# Patient Record
Sex: MALE | Race: WHITE | NOT HISPANIC OR LATINO | Employment: PART TIME | URBAN - METROPOLITAN AREA
[De-identification: names, ages, dates, MRNs, and addresses within clinical notes are randomized per-mention and may not be internally consistent; named-entity substitution may affect disease eponyms.]

---

## 2017-05-11 ENCOUNTER — HOSPITAL ENCOUNTER (EMERGENCY)
Facility: HOSPITAL | Age: 27
Discharge: HOME | End: 2017-05-11
Attending: EMERGENCY MEDICINE | Admitting: EMERGENCY MEDICINE
Payer: COMMERCIAL

## 2017-05-11 VITALS
HEIGHT: 74 IN | OXYGEN SATURATION: 100 % | WEIGHT: 200 LBS | BODY MASS INDEX: 25.67 KG/M2 | HEART RATE: 58 BPM | SYSTOLIC BLOOD PRESSURE: 123 MMHG | TEMPERATURE: 98.4 F | DIASTOLIC BLOOD PRESSURE: 65 MMHG

## 2017-05-11 DIAGNOSIS — B02.9 HERPES ZOSTER: Primary | ICD-10-CM

## 2017-05-11 DIAGNOSIS — H11.30 SUBCONJUNCTIVAL HEMORRHAGE: ICD-10-CM

## 2017-05-11 LAB
CLARITY, POC: CLEAR
COLOR, POC: YELLOW
EXT BILIRUBIN, UA: NORMAL
EXT BLOOD URINE: NORMAL
EXT GLUCOSE, UA: NORMAL
EXT KETONES: NORMAL
EXT NITRITE, UA: NORMAL
EXT PH, UA: 7
EXT PROTEIN, UA: NORMAL
EXT SPECIFIC GRAVITY, UA: 1
EXT UROBILINOGEN: NORMAL
WBC # BLD EST: NORMAL 10*3/UL

## 2017-05-11 PROCEDURE — 81002 URINALYSIS NONAUTO W/O SCOPE: CPT | Performed by: EMERGENCY MEDICINE

## 2017-05-11 PROCEDURE — 99283 EMERGENCY DEPT VISIT LOW MDM: CPT

## 2017-05-11 RX ORDER — OXYCODONE HYDROCHLORIDE AND ACETAMINOPHEN 5; 325 MG/1; MG/1
1 TABLET ORAL EVERY 6 HOURS PRN
Qty: 10 TABLET | Refills: 0 | Status: SHIPPED | OUTPATIENT
Start: 2017-05-11 | End: 2017-07-22 | Stop reason: ALTCHOICE

## 2017-05-11 RX ORDER — VALACYCLOVIR HYDROCHLORIDE 1 G/1
1000 TABLET, FILM COATED ORAL 3 TIMES DAILY
Qty: 21 TABLET | Refills: 0 | Status: SHIPPED | OUTPATIENT
Start: 2017-05-11 | End: 2017-07-22 | Stop reason: ALTCHOICE

## 2017-07-22 ENCOUNTER — APPOINTMENT (EMERGENCY)
Dept: RADIOLOGY | Facility: HOSPITAL | Age: 27
End: 2017-07-22
Payer: COMMERCIAL

## 2017-07-22 ENCOUNTER — HOSPITAL ENCOUNTER (EMERGENCY)
Facility: HOSPITAL | Age: 27
Discharge: HOME | End: 2017-07-22
Attending: EMERGENCY MEDICINE
Payer: COMMERCIAL

## 2017-07-22 VITALS
WEIGHT: 200 LBS | RESPIRATION RATE: 16 BRPM | SYSTOLIC BLOOD PRESSURE: 108 MMHG | TEMPERATURE: 98.3 F | OXYGEN SATURATION: 98 % | BODY MASS INDEX: 25.67 KG/M2 | HEART RATE: 80 BPM | DIASTOLIC BLOOD PRESSURE: 65 MMHG | HEIGHT: 74 IN

## 2017-07-22 DIAGNOSIS — S46.811A STRAIN OF RIGHT TRAPEZIUS MUSCLE: ICD-10-CM

## 2017-07-22 DIAGNOSIS — S93.401A SPRAIN OF UNSPECIFIED LIGAMENT OF RIGHT ANKLE, INITIAL ENCOUNTER: Primary | ICD-10-CM

## 2017-07-22 PROCEDURE — 99283 EMERGENCY DEPT VISIT LOW MDM: CPT

## 2017-07-22 PROCEDURE — 73610 X-RAY EXAM OF ANKLE: CPT | Performed by: EMERGENCY MEDICINE

## 2017-07-22 PROCEDURE — 73030 X-RAY EXAM OF SHOULDER: CPT | Performed by: EMERGENCY MEDICINE

## 2017-07-22 RX ORDER — IBUPROFEN 600 MG/1
600 TABLET ORAL ONCE
Status: COMPLETED | OUTPATIENT
Start: 2017-07-22 | End: 2017-07-22

## 2017-07-22 RX ADMIN — IBUPROFEN 600 MG: 600 TABLET ORAL at 19:36

## 2017-08-19 ENCOUNTER — ALLSCRIPTS OFFICE VISIT (OUTPATIENT)
Dept: OTHER | Facility: OTHER | Age: 27
End: 2017-08-19

## 2017-08-19 DIAGNOSIS — S46.911A STRAIN OF UNSPECIFIED MUSCLE, FASCIA AND TENDON AT SHOULDER AND UPPER ARM LEVEL, RIGHT ARM, INITIAL ENCOUNTER: ICD-10-CM

## 2017-08-19 DIAGNOSIS — M75.81 OTHER SHOULDER LESIONS, RIGHT SHOULDER: ICD-10-CM

## 2017-09-07 ENCOUNTER — TRANSCRIBE ORDERS (OUTPATIENT)
Dept: ADMINISTRATIVE | Facility: HOSPITAL | Age: 27
End: 2017-09-07

## 2017-09-07 ENCOUNTER — ALLSCRIPTS OFFICE VISIT (OUTPATIENT)
Dept: OTHER | Facility: OTHER | Age: 27
End: 2017-09-07

## 2017-09-07 DIAGNOSIS — M25.511 RIGHT SHOULDER PAIN, UNSPECIFIED CHRONICITY: Primary | ICD-10-CM

## 2017-09-13 ENCOUNTER — HOSPITAL ENCOUNTER (OUTPATIENT)
Dept: RADIOLOGY | Facility: HOSPITAL | Age: 27
Discharge: HOME | End: 2017-09-13
Attending: ORTHOPAEDIC SURGERY
Payer: COMMERCIAL

## 2017-09-13 ENCOUNTER — HOSPITAL ENCOUNTER (OUTPATIENT)
Dept: MRI IMAGING | Facility: HOSPITAL | Age: 27
Discharge: HOME | End: 2017-09-13
Attending: ORTHOPAEDIC SURGERY
Payer: COMMERCIAL

## 2017-09-13 DIAGNOSIS — M25.511 RIGHT SHOULDER PAIN, UNSPECIFIED CHRONICITY: ICD-10-CM

## 2017-09-13 PROCEDURE — A9585 GADOBUTROL INJECTION: HCPCS | Performed by: ORTHOPAEDIC SURGERY

## 2017-09-13 PROCEDURE — 77002 NEEDLE LOCALIZATION BY XRAY: CPT

## 2017-09-13 PROCEDURE — 73222 MRI JOINT UPR EXTREM W/DYE: CPT

## 2017-09-13 PROCEDURE — 23350 INJECTION FOR SHOULDER X-RAY: CPT

## 2017-09-13 RX ADMIN — IOHEXOL 50 ML: 300 INJECTION, SOLUTION INTRAVENOUS at 16:34

## 2017-09-13 RX ADMIN — GADOBUTROL 0.2 ML: 604.72 INJECTION INTRAVENOUS at 16:34

## 2017-09-18 ENCOUNTER — GENERIC CONVERSION - ENCOUNTER (OUTPATIENT)
Dept: OTHER | Facility: OTHER | Age: 27
End: 2017-09-18

## 2017-09-25 ENCOUNTER — GENERIC CONVERSION - ENCOUNTER (OUTPATIENT)
Dept: OTHER | Facility: OTHER | Age: 27
End: 2017-09-25

## 2017-09-27 ENCOUNTER — GENERIC CONVERSION - ENCOUNTER (OUTPATIENT)
Dept: OTHER | Facility: OTHER | Age: 27
End: 2017-09-27

## 2018-01-12 VITALS
HEIGHT: 75 IN | WEIGHT: 198 LBS | HEART RATE: 70 BPM | DIASTOLIC BLOOD PRESSURE: 80 MMHG | OXYGEN SATURATION: 98 % | SYSTOLIC BLOOD PRESSURE: 110 MMHG | BODY MASS INDEX: 24.62 KG/M2 | RESPIRATION RATE: 16 BRPM

## 2018-01-13 VITALS
HEIGHT: 75 IN | BODY MASS INDEX: 25.24 KG/M2 | SYSTOLIC BLOOD PRESSURE: 109 MMHG | DIASTOLIC BLOOD PRESSURE: 64 MMHG | HEART RATE: 66 BPM | WEIGHT: 203 LBS

## 2018-01-22 VITALS
WEIGHT: 200 LBS | BODY MASS INDEX: 24.87 KG/M2 | HEIGHT: 75 IN | HEART RATE: 68 BPM | SYSTOLIC BLOOD PRESSURE: 116 MMHG | DIASTOLIC BLOOD PRESSURE: 72 MMHG

## 2018-02-10 ENCOUNTER — HOSPITAL ENCOUNTER (EMERGENCY)
Facility: HOSPITAL | Age: 28
Discharge: HOME | End: 2018-02-11
Attending: EMERGENCY MEDICINE
Payer: COMMERCIAL

## 2018-02-10 VITALS
RESPIRATION RATE: 10 BRPM | TEMPERATURE: 96.5 F | BODY MASS INDEX: 25.67 KG/M2 | HEIGHT: 74 IN | OXYGEN SATURATION: 93 % | WEIGHT: 200 LBS | DIASTOLIC BLOOD PRESSURE: 56 MMHG | SYSTOLIC BLOOD PRESSURE: 117 MMHG | HEART RATE: 53 BPM

## 2018-02-10 DIAGNOSIS — T40.601A OPIATE OVERDOSE, ACCIDENTAL OR UNINTENTIONAL, INITIAL ENCOUNTER (HCC): Primary | ICD-10-CM

## 2018-02-10 LAB
AMPHETAMINES SERPL QL SCN: NEGATIVE
ANION GAP SERPL CALCULATED.3IONS-SCNC: 9 MMOL/L (ref 4–13)
BARBITURATES UR QL: NEGATIVE
BASOPHILS # BLD AUTO: 0.03 THOUSANDS/ΜL (ref 0–0.1)
BASOPHILS NFR BLD AUTO: 0 % (ref 0–1)
BENZODIAZ UR QL: NEGATIVE
BILIRUB UR QL STRIP: NEGATIVE
BUN SERPL-MCNC: 13 MG/DL (ref 5–25)
CALCIUM SERPL-MCNC: 8.9 MG/DL (ref 8.3–10.1)
CHLORIDE SERPL-SCNC: 99 MMOL/L (ref 100–108)
CLARITY UR: CLEAR
CO2 SERPL-SCNC: 32 MMOL/L (ref 21–32)
COCAINE UR QL: NEGATIVE
COLOR UR: YELLOW
CREAT SERPL-MCNC: 1.04 MG/DL (ref 0.6–1.3)
EOSINOPHIL # BLD AUTO: 0.05 THOUSAND/ΜL (ref 0–0.61)
EOSINOPHIL NFR BLD AUTO: 1 % (ref 0–6)
ERYTHROCYTE [DISTWIDTH] IN BLOOD BY AUTOMATED COUNT: 15.2 % (ref 11.6–15.1)
GFR SERPL CREATININE-BSD FRML MDRD: 97 ML/MIN/1.73SQ M
GLUCOSE SERPL-MCNC: 107 MG/DL (ref 65–140)
GLUCOSE UR STRIP-MCNC: NEGATIVE MG/DL
HCT VFR BLD AUTO: 48.4 % (ref 36.5–49.3)
HGB BLD-MCNC: 16.2 G/DL (ref 12–17)
HGB UR QL STRIP.AUTO: NEGATIVE
KETONES UR STRIP-MCNC: NEGATIVE MG/DL
LEUKOCYTE ESTERASE UR QL STRIP: NEGATIVE
LYMPHOCYTES # BLD AUTO: 1.24 THOUSANDS/ΜL (ref 0.6–4.47)
LYMPHOCYTES NFR BLD AUTO: 12 % (ref 14–44)
MCH RBC QN AUTO: 29.8 PG (ref 26.8–34.3)
MCHC RBC AUTO-ENTMCNC: 33.5 G/DL (ref 31.4–37.4)
MCV RBC AUTO: 89 FL (ref 82–98)
METHADONE UR QL: NEGATIVE
MONOCYTES # BLD AUTO: 0.83 THOUSAND/ΜL (ref 0.17–1.22)
MONOCYTES NFR BLD AUTO: 8 % (ref 4–12)
NEUTROPHILS # BLD AUTO: 8.57 THOUSANDS/ΜL (ref 1.85–7.62)
NEUTS SEG NFR BLD AUTO: 79 % (ref 43–75)
NITRITE UR QL STRIP: NEGATIVE
OPIATES UR QL SCN: POSITIVE
PCP UR QL: NEGATIVE
PH UR STRIP.AUTO: 6 [PH] (ref 4.5–8)
PLATELET # BLD AUTO: 332 THOUSANDS/UL (ref 149–390)
PMV BLD AUTO: 9.7 FL (ref 8.9–12.7)
POTASSIUM SERPL-SCNC: 3.4 MMOL/L (ref 3.5–5.3)
PROT UR STRIP-MCNC: NEGATIVE MG/DL
RBC # BLD AUTO: 5.44 MILLION/UL (ref 3.88–5.62)
SODIUM SERPL-SCNC: 140 MMOL/L (ref 136–145)
SP GR UR STRIP.AUTO: 1.02 (ref 1–1.03)
THC UR QL: NEGATIVE
UROBILINOGEN UR QL STRIP.AUTO: 0.2 E.U./DL
WBC # BLD AUTO: 10.72 THOUSAND/UL (ref 4.31–10.16)

## 2018-02-10 PROCEDURE — 80048 BASIC METABOLIC PNL TOTAL CA: CPT | Performed by: EMERGENCY MEDICINE

## 2018-02-10 PROCEDURE — 85025 COMPLETE CBC W/AUTO DIFF WBC: CPT | Performed by: EMERGENCY MEDICINE

## 2018-02-10 PROCEDURE — 93005 ELECTROCARDIOGRAM TRACING: CPT

## 2018-02-10 PROCEDURE — 80307 DRUG TEST PRSMV CHEM ANLYZR: CPT | Performed by: EMERGENCY MEDICINE

## 2018-02-10 PROCEDURE — 81003 URINALYSIS AUTO W/O SCOPE: CPT | Performed by: EMERGENCY MEDICINE

## 2018-02-10 PROCEDURE — 36415 COLL VENOUS BLD VENIPUNCTURE: CPT | Performed by: EMERGENCY MEDICINE

## 2018-02-10 RX ORDER — NALOXONE HYDROCHLORIDE 4 MG/.1ML
1 SPRAY NASAL AS NEEDED
Qty: 2 EACH | Refills: 2 | Status: SHIPPED | OUTPATIENT
Start: 2018-02-10 | End: 2019-01-16

## 2018-02-11 PROCEDURE — 99284 EMERGENCY DEPT VISIT MOD MDM: CPT

## 2018-02-11 NOTE — ED NOTES
Pt to ED after father found him on bathroom floor, pt reports he tried to inject 1 bag of heroin, but thinks it may have had fentanyl in it, dad administered Narcan that he had gotten from pharmacy  Pt woke up and dad drove him to ED       Billy Dale, RN  02/10/18 7164

## 2018-02-11 NOTE — DISCHARGE INSTRUCTIONS
Narcotic Abuse   WHAT YOU NEED TO KNOW:   Narcotics are medicines used to decrease or take away severe pain  Narcotics may also be called opioids  Some common names of narcotics ordered by a doctor are codeine and morphine  Heroin is an illegal street drug that is made from morphine  DISCHARGE INSTRUCTIONS:   Return to the emergency department if:   · You are very drowsy  · Your speech is slurred  · You have trouble thinking, remembering things, or focusing  Contact your healthcare provider if:   · You want help or information on how to stop using or abusing narcotics  Follow up with your healthcare provider as directed:  Write down your questions so you remember to ask them during your visits  Narcotic intoxication  usually lasts for several hours  You may have the following during or after you use narcotics:  · Behavior or mood changes, such as a great feeling followed by the feeling that you do not care about anyone or anything    · Trouble thinking, remembering things, or focusing    · Small pupils    · Feeling very drowsy    · Slurred speech  Narcotic withdrawal  occurs if you stop using narcotics after using them heavily over a period of time  Signs and symptoms may begin within minutes or days and continue for days or even months:  · Depression and anxiety    · Nausea or vomiting    · Muscle aches    · Watery eyes or runny nose    · Large pupils    · Sweating or goosebumps on your skin    · Diarrhea    · Fever    · Trouble sleeping  How narcotics can harm a pregnant woman and her baby:   · Tell your healthcare provider right away if you are trying to get pregnant or you are pregnant and you are using narcotics  Your doctor may suggest other medicines to control pain and prevent withdrawal  If you go through withdrawal while pregnant, you may miscarry your baby, or he may be stillborn  He may be very small and have other medical problems      · When your baby is born, he may show signs of withdrawal  This includes unexpected weight loss, poor feeding, and more crying than normal  Your baby may also have a fever, vomit, and have diarrhea  He may also have learning problems or other health issues when he gets older  If you have a baby and you are using narcotics, you may have trouble caring for your baby  Narcotics may be passed to your baby through breast milk  Talk to your healthcare provider before breastfeeding your baby if you are using narcotics  For support and more information:   · Substance Abuse and [address] , [address] [place]  Web Address: https://[employer]/  · [facility] on Drug Abuse  [address] [place] [address], [address] [place] [phone]  Phone: 9- 366 - 875-6265  Web Address: www davis nih gov  © 2017 [facility] [place] [address] is for End User's use only and may not be sold, redistributed or otherwise used for commercial purposes  All illustrations and images included in CareNotes® are the copyrighted property of [employer] A [employer] , [employer]  or Brayden Kay  The above information is an  only  It is not intended as medical advice for individual conditions or treatments  Talk to your doctor, nurse or pharmacist before following any medical regimen to see if it is safe and effective for you  Opioid Overdose   WHAT YOU NEED TO KNOW:   Opioids are prescription medicines used to treat pain  Some examples include morphine, codeine, methadone, oxycodone, and hydrocodone  An overdose can occur if you take more than the recommended amount  It can also occur if you take opioids with alcohol or certain medicines that can cause harm if taken together  An overdose can also occur if you take an opioid that was prescribed for someone else  Learn to take these medicines safely  An opioid overdose can be life-threatening     DISCHARGE INSTRUCTIONS:   Call 911 for any of the following:   · You have trouble staying awake and your breathing is slow or shallow  Return to the emergency department if:   · Your speech is slurred, or you are confused  · You are dizzy or stumble when you walk  · You are extremely drowsy, or you have trouble staying awake or speaking  · Your body is limp  · You have pale or clammy skin  · You have blue fingernails or lips  · Your heartbeat is slower than normal   Contact your healthcare provider if:   · You have questions or concerns about your condition or care  Follow up with your healthcare provider as directed:  Write down your questions so you remember to ask them during your visits  Prevent opioid overdose:   · Take your medicine exactly as directed  Do not take more of the recommended amount of opioids each time you take it  Do not take opioids more often than recommended  If you use a pain patch, be sure to remove the old patch before you place a new one  · Do not take opioids that belong to someone else  The amount of opioids that person takes may not be right for you  · Do not mix opioids with alcohol, sleeping pills, or street drugs  The combination of these substances can cause an overdose  · Learn about the signs of an overdose  so you know how to respond  Tell others about these symptoms so they will know what to do if needed  Talk to your healthcare provider about naloxone  In some states, you may be able to keep naloxone at home in case of an overdose  Your family and friends can also be trained on how to give it to you if needed  · Keep opioids out of the reach of children  Store opioids in a locked cabinet or in a location that children cannot get to  Ask your healthcare provider how to dispose of any unused opioid medicines  Counseling: Your healthcare provider may recommend that you see a counselor if you are abusing opioids     © 2017 [employer] Information is for End User's use only and may not be sold, redistributed or otherwise used for commercial purposes  All illustrations and images included in CareNotes® are the copyrighted property of A D A M , Inc  or Brayden Kay  The above information is an  only  It is not intended as medical advice for individual conditions or treatments  Talk to your doctor, nurse or pharmacist before following any medical regimen to see if it is safe and effective for you

## 2018-02-11 NOTE — ED PROVIDER NOTES
History  Chief Complaint   Patient presents with    Heroin Overdose - Accidental     Pt to ED, reports used 1 bag of heroin, then went out, dad found him on floor and administered narcan  History from patient and his father  This 33-year-old man admits to injecting 1 bag of heroin sometime around 6:00 p m  tonight  His father states that between 56 and 12 he found the patient unresponsive in the bathroom floor  He gave 1 dose of Narcan intranasally and aroused him  It took a few minutes for the patient to wake up  He was nauseous and complained of slight left chest tightness but now has no specific complaint  While the father and mother monitored him over the last few hours they noted he was bradycardic and want him checked out  Patient states otherwise he has been sober for several months  He has been in intensive outpatient therapy for drug use  He denies other drug and alcohol ingestion recently  He states that he found a bag of heroin in his old closed trauma today and decided to use it since he was not working  None       History reviewed  No pertinent past medical history  Past Surgical History:   Procedure Laterality Date    CYST REMOVAL      L chest        History reviewed  No pertinent family history  I have reviewed and agree with the history as documented  Social History   Substance Use Topics    Smoking status: Former Smoker     Types: Cigarettes    Smokeless tobacco: Never Used      Comment: quit 2 months ago     Alcohol use No        Review of Systems   Constitutional: Negative  HENT: Negative  Eyes: Negative  Respiratory: Negative  Cardiovascular: Negative  Gastrointestinal: Negative  Endocrine: Negative  Genitourinary: Negative  Musculoskeletal: Negative  Skin: Negative  Rash:   Rash around the right eye and left leg for the last week  Allergic/Immunologic: Negative  Neurological: Negative  Hematological: Negative  Psychiatric/Behavioral: Negative  All other systems reviewed and are negative  Physical Exam  ED Triage Vitals [02/10/18 2210]   Temperature Pulse Respirations Blood Pressure SpO2   (!) 96 5 °F (35 8 °C) (!) 53 14 130/71 96 %      Temp Source Heart Rate Source Patient Position - Orthostatic VS BP Location FiO2 (%)   Tympanic Monitor Lying Right arm --      Pain Score       --           Orthostatic Vital Signs  Vitals:    02/10/18 2210 02/10/18 2300 02/10/18 2315 02/10/18 2345   BP: 130/71 127/62 127/61 117/56   Pulse: (!) 53 62 74 (!) 53   Patient Position - Orthostatic VS: Lying Lying Lying Lying       Physical Exam   Constitutional: He is oriented to person, place, and time  He appears well-developed and well-nourished  No distress  HENT:   Head: Normocephalic and atraumatic  Right Ear: External ear normal    Left Ear: External ear normal    Mouth/Throat: Oropharynx is clear and moist    Eyes: Conjunctivae and EOM are normal  Pupils are equal, round, and reactive to light  Neck: Normal range of motion  Neck supple  No JVD present  Cardiovascular: Normal rate, regular rhythm, normal heart sounds and intact distal pulses  No murmur heard  Pulmonary/Chest: Effort normal and breath sounds normal    Abdominal: Soft  Bowel sounds are normal  He exhibits no distension and no mass  There is no tenderness  There is no rebound and no guarding  Musculoskeletal: Normal range of motion  He exhibits no edema or tenderness  Lymphadenopathy:     He has no cervical adenopathy  Neurological: He is alert and oriented to person, place, and time  He has normal reflexes  No cranial nerve deficit  Coordination normal    Skin: Skin is warm and dry  Rash ( scatteredPapillary rash of the right eyelid and right forehead  Several scattered small folliculitis type of lesions of the left leg and left shoulder no signs of cellulitis  No blistering ) noted  He is not diaphoretic     Psychiatric: He has a normal mood and affect  His behavior is normal    Nursing note and vitals reviewed  ED Medications  Medications - No data to display    Diagnostic Studies  Results Reviewed     Procedure Component Value Units Date/Time    Basic metabolic panel [[id number]]  (Abnormal) Collected:  02/10/18 2313    Lab Status:  Final result Specimen:  Blood from Arm, Right Updated:  02/10/18 2341     Sodium 140 mmol/L      Potassium 3 4 (L) mmol/L      Chloride 99 (L) mmol/L      CO2 32 mmol/L      Anion Gap 9 mmol/L      BUN 13 mg/dL      Creatinine 1 04 mg/dL      Glucose 107 mg/dL      Calcium 8 9 mg/dL      eGFR 97 ml/min/1 73sq m     Narrative:         National Kidney Disease Education Program recommendations are as follows:  GFR calculation is accurate only with a steady state creatinine  Chronic Kidney disease less than 60 ml/min/1 73 sq  meters  Kidney failure less than 15 ml/min/1 73 sq  meters  Rapid drug screen, urine [[id number]]  (Abnormal) Collected:  02/10/18 2313    Lab Status:  Final result Specimen:  Urine from Urine, Catheter Updated:  02/10/18 2337     Amph/Meth UR Negative     Barbiturate Ur Negative     Benzodiazepine Urine Negative     Cocaine Urine Negative     Methadone Urine Negative     Opiate Urine Positive (A)     PCP Ur Negative     THC Urine Negative    Narrative:         Presumptive report  If requested, specimen will be sent to reference lab for confirmation  FOR MEDICAL PURPOSES ONLY  IF CONFIRMATION NEEDED PLEASE CONTACT THE LAB WITHIN 5 DAYS      Drug Screen Cutoff Levels:  AMPHETAMINE/METHAMPHETAMINES  1000 ng/mL  BARBITURATES     200 ng/mL  BENZODIAZEPINES     200 ng/mL  COCAINE      300 ng/mL  METHADONE      300 ng/mL  OPIATES      300 ng/mL  PHENCYCLIDINE     25 ng/mL  THC       50 ng/mL    UA w Reflex to Microscopic [[id number]]  (Normal) Collected:  02/10/18 2313    Lab Status:  Final result Specimen:  Urine from Urine, Clean Catch Updated:  02/10/18 2330     Color, UA Yellow     Clarity, UA Clear Specific Gravity, UA 1 025     pH, UA 6 0     Leukocytes, UA Negative     Nitrite, UA Negative     Protein, UA Negative mg/dl      Glucose, UA Negative mg/dl      Ketones, UA Negative mg/dl      Urobilinogen, UA 0 2 E U /dl      Bilirubin, UA Negative     Blood, UA Negative    CBC and differential [[id number]]  (Abnormal) Collected:  02/10/18 2313    Lab Status:  Final result Specimen:  Blood from Arm, Right Updated:  02/10/18 2325     WBC 10 72 (H) Thousand/uL      RBC 5 44 Million/uL      Hemoglobin 16 2 g/dL      Hematocrit 48 4 %      MCV 89 fL      MCH 29 8 pg      MCHC 33 5 g/dL      RDW 15 2 (H) %      MPV 9 7 fL      Platelets 611 Thousands/uL      Neutrophils Relative 79 (H) %      Lymphocytes Relative 12 (L) %      Monocytes Relative 8 %      Eosinophils Relative 1 %      Basophils Relative 0 %      Neutrophils Absolute 8 57 (H) Thousands/µL      Lymphocytes Absolute 1 24 Thousands/µL      Monocytes Absolute 0 83 Thousand/µL      Eosinophils Absolute 0 05 Thousand/µL      Basophils Absolute 0 03 Thousands/µL                  No orders to display              Procedures  ECG 12 Lead Documentation  Date/Time: 2/10/2018 9:51 PM  Performed by: Dakotah Angel  Authorized by: Dakotah Angel     Previous ECG:     Previous ECG:  Compared to current    Comparison ECG info:   compared to November 19, 2015, heart rate has decreased by 63 beats per minute  T-wave inversion of inferior leads no longer present  Similarity:  Changes noted  Interpretation:     Interpretation comment:  Sinus bradycardia  Phone Contacts  ED Phone Contact    ED Course  ED Course as of Feb 11 0205   Sat Feb 10, 2018   2358  Awake and alert  Father at the bedside  Patient is cooperative and will be discharged                                  MDM  Number of Diagnoses or Management Options  Opiate overdose, accidental or unintentional, initial encounter: new and requires workup     Amount and/or Complexity of Data Reviewed  Clinical lab tests: ordered and reviewed  Obtain history from someone other than the patient: yes  Independent visualization of images, tracings, or specimens: yes      CritCare Time    Disposition  Final diagnoses:   Opiate overdose, accidental or unintentional, initial encounter     Time reflects when diagnosis was documented in both MDM as applicable and the Disposition within this note     Time User Action Codes Description Comment    2/10/2018 11:49 PM Jim Sánchez Add [T40 601A] Opiate overdose, accidental or unintentional, initial encounter       ED Disposition     ED Disposition Condition Comment    Discharge  [place]  discharge to home/self care  Condition at discharge: Stable        Follow-up Information     Follow up With Specialties Details Why Contact Info    Emre Wan MD Family Medicine Call As needed [id number] [facility] [address]      [place] Call in 1 day  [address] [facility] [address] [facility]  [phone]          Discharge Medication List as of 2/10/2018 11:52 PM      START taking these medications    Details   Naloxone HCl 4 MG/0 1ML LIQD 0 1 mL (4 mg total) into each nostril as needed (opiate overdose), Starting Sat 2/10/2018, Normal           No discharge procedures on file      ED Provider  Electronically Signed by           Micheline Corona DO  02/11/18 3831

## 2018-02-12 LAB
ATRIAL RATE: 53 BPM
P AXIS: 67 DEGREES
PR INTERVAL: 136 MS
QRS AXIS: 86 DEGREES
QRSD INTERVAL: 100 MS
QT INTERVAL: 408 MS
QTC INTERVAL: 382 MS
T WAVE AXIS: 55 DEGREES
VENTRICULAR RATE: 53 BPM

## 2018-03-25 ENCOUNTER — OFFICE VISIT (OUTPATIENT)
Dept: URGENT CARE | Facility: CLINIC | Age: 28
End: 2018-03-25
Payer: COMMERCIAL

## 2018-03-25 VITALS
TEMPERATURE: 98 F | WEIGHT: 204 LBS | BODY MASS INDEX: 25.36 KG/M2 | HEIGHT: 75 IN | HEART RATE: 60 BPM | DIASTOLIC BLOOD PRESSURE: 80 MMHG | RESPIRATION RATE: 16 BRPM | SYSTOLIC BLOOD PRESSURE: 124 MMHG | OXYGEN SATURATION: 97 %

## 2018-03-25 DIAGNOSIS — L20.89 OTHER ATOPIC DERMATITIS: Primary | ICD-10-CM

## 2018-03-25 PROCEDURE — G0382 LEV 3 HOSP TYPE B ED VISIT: HCPCS

## 2018-03-25 PROCEDURE — 99283 EMERGENCY DEPT VISIT LOW MDM: CPT

## 2018-03-25 RX ORDER — METHYLPREDNISOLONE 4 MG/1
TABLET ORAL
Qty: 1 EACH | Refills: 0 | Status: SHIPPED | OUTPATIENT
Start: 2018-03-25 | End: 2019-01-16

## 2018-03-26 NOTE — PROGRESS NOTES
[facility]        NAME: Wayne Coats is a 32 y o  male  : 1990    MRN: [id number]  DATE: 2018  TIME: 8:30 PM    Assessment and Plan   Other atopic dermatitis [L20 89]  1  Other atopic dermatitis  Methylprednisolone 4 MG TBPK         Patient Instructions       Follow up with PCP in 3-5 days  Proceed to  ER if symptoms worsen  Chief Complaint     Chief Complaint   Patient presents with    Rash     Rash under right eye for 1 month  Tried every OTC with no relief  States stings, burns, itchy, irritating  Right ear itchy and irritating, maybe like swimmers ear  History of Present Illness       64-year-old male presents complaining of rash under right eye for about a month as well as itchy ear canal on the right for several days  He reports he has tried witch Hazel, anti-itch cream, Lavender and other homeopathic remedies on the rash but did not improve  He reports it is not painful non itchy  It is not spreading  No injury to the area  Never any vesicles  He has not had anything like this before and denies a childhood history of eczema or asthma  Also complaining of itchy right ear canal   Denies pain, fever or chills  No change in hearing, no discharge  He has not tried anything for relief  No URI symptoms        Review of Systems   Review of Systems   Constitutional: Negative for chills and fever  HENT: Positive for ear pain  Negative for ear discharge, postnasal drip, rhinorrhea, sinus pressure and sore throat  Respiratory: Negative for cough and shortness of breath  Cardiovascular: Negative for chest pain  Skin: Positive for rash  Negative for wound           Current Medications       Current Outpatient Prescriptions:     Methylprednisolone 4 MG TBPK, Use as directed on package, Disp: 1 each, Rfl: 0    Naloxone HCl 4 MG/0 1ML LIQD, 0 1 mL (4 mg total) into each nostril as needed (opiate overdose), Disp: 2 each, Rfl: 2    Current Allergies     Allergies as of 03/25/2018    (No Known Allergies)            The following portions of the patient's history were reviewed and updated as appropriate: allergies, current medications, past family history, past medical history, past social history, past surgical history and problem list      No past medical history on file  Past Surgical History:   Procedure Laterality Date    CYST REMOVAL      L chest        No family history on file  Medications have been verified  Objective   /80   Pulse 60   Temp 98 °F (36 7 °C)   Resp 16   Ht 6' 3" (1 905 m)   Wt 92 5 kg (204 lb)   SpO2 97%   BMI 25 50 kg/m²        Physical Exam     Physical Exam   Constitutional: He is oriented to person, place, and time  He appears well-developed and well-nourished  No distress  HENT:   Head: Normocephalic and atraumatic  Right Ear: Hearing, tympanic membrane, external ear and ear canal normal    Left Ear: Hearing, tympanic membrane, external ear and ear canal normal    Mouth/Throat: Oropharynx is clear and moist    Eyes: Conjunctivae and EOM are normal  Pupils are equal, round, and reactive to light  Right eye exhibits no discharge  Left eye exhibits no discharge  No scleral icterus  Neck: Normal range of motion  Neck supple  No thyromegaly present  Cardiovascular: Normal rate, regular rhythm and normal heart sounds  Exam reveals no gallop and no friction rub  No murmur heard  Pulmonary/Chest: Effort normal and breath sounds normal  No respiratory distress  He has no wheezes  He has no rales  Musculoskeletal: Normal range of motion  He exhibits no edema  Lymphadenopathy:     He has no cervical adenopathy  Neurological: He is alert and oriented to person, place, and time  No cranial nerve deficit  Skin: Skin is warm and dry  Rash (Several erythematous papules about 2 inches below right eye  No scaling or pus) noted  He is not diaphoretic  No erythema  No pallor

## 2019-01-16 ENCOUNTER — OFFICE VISIT (OUTPATIENT)
Dept: FAMILY MEDICINE CLINIC | Facility: CLINIC | Age: 29
End: 2019-01-16
Payer: COMMERCIAL

## 2019-01-16 VITALS
OXYGEN SATURATION: 98 % | HEIGHT: 75 IN | HEART RATE: 77 BPM | BODY MASS INDEX: 26.49 KG/M2 | WEIGHT: 213 LBS | SYSTOLIC BLOOD PRESSURE: 128 MMHG | DIASTOLIC BLOOD PRESSURE: 86 MMHG

## 2019-01-16 DIAGNOSIS — Z77.21 HISTORY OF EXPOSURE TO HAZARDOUS BODILY FLUIDS: Primary | ICD-10-CM

## 2019-01-16 PROCEDURE — 3008F BODY MASS INDEX DOCD: CPT | Performed by: FAMILY MEDICINE

## 2019-01-16 PROCEDURE — 1036F TOBACCO NON-USER: CPT | Performed by: FAMILY MEDICINE

## 2019-01-16 PROCEDURE — 99213 OFFICE O/P EST LOW 20 MIN: CPT | Performed by: FAMILY MEDICINE

## 2019-01-16 NOTE — PROGRESS NOTES
[address]  [facility]        NAME: Marlyn Castro is a 25 y o  male  : 1990    MRN: [id number]  DATE: 2019  TIME: 6:00 PM    Assessment and Plan   History of exposure to hazardous bodily fluids [Z77 21]  1  History of exposure to hazardous bodily fluids  HIV-1 RNA, quantitative, PCR    Hepatitis C antibody    Chlamydia/GC amplified DNA by PCR    HIV-1 RNA, quantitative, PCR    Hepatitis C antibody    Chlamydia/GC amplified DNA by PCR         Patient Instructions     Patient Instructions   Ck labs for STD          Chief Complaint     Chief Complaint   Patient presents with    STD testing         History of Present Illness       C/o unprotected sex sev mos ago        Review of Systems   Review of Systems   Constitutional: Negative for fatigue, fever and unexpected weight change  HENT: Negative for congestion, sinus pressure and sore throat  Eyes: Negative for visual disturbance  Respiratory: Negative for shortness of breath and wheezing  Cardiovascular: Negative for chest pain and palpitations  Gastrointestinal: Negative for abdominal pain, diarrhea, nausea and vomiting  Genitourinary: Negative  Current Medications     No current outpatient prescriptions on file  Current Allergies     Allergies as of 2019    (No Known Allergies)            The following portions of the patient's history were reviewed and updated as appropriate: allergies, current medications, past family history, past medical history, past social history, past surgical history and problem list      History reviewed  No pertinent past medical history  Past Surgical History:   Procedure Laterality Date    CYST REMOVAL      L chest        Family History   Problem Relation Age of Onset    Hypertension Mother          Medications have been verified          Objective   /86   Pulse 77   Ht 6' 3" (1 905 m)   Wt 96 6 kg (213 lb)   SpO2 98%   BMI 26 62 kg/m²        Physical Exam     Physical Exam   Cardiovascular: Normal heart sounds      Pulmonary/Chest: Breath sounds normal    Genitourinary:   Genitourinary Comments: Genital exam NL

## 2019-01-29 LAB
C TRACH RRNA SPEC QL NAA+PROBE: NEGATIVE
HCV AB S/CO SERPL IA: >11 S/CO RATIO (ref 0–0.9)
HIV1 RNA # SERPL NAA+PROBE: <20 COPIES/ML
HIV1 RNA SERPL NAA+PROBE-LOG#: NORMAL LOG10COPY/ML
N GONORRHOEA RRNA SPEC QL NAA+PROBE: NEGATIVE

## 2019-01-30 ENCOUNTER — TELEPHONE (OUTPATIENT)
Dept: FAMILY MEDICINE CLINIC | Facility: CLINIC | Age: 29
End: 2019-01-30

## 2019-01-30 DIAGNOSIS — B19.20 HEPATITIS C VIRUS INFECTION WITHOUT HEPATIC COMA, UNSPECIFIED CHRONICITY: Primary | ICD-10-CM

## 2019-01-30 NOTE — TELEPHONE ENCOUNTER
I spoke to patient regarding his Lab Results, he WAS aware that he has Hep C and he has been treated  I told him I would check with Dr Genesis Ybarra whether he wants him to still see GI or if he will order additional testing as his Ab screen will always be positive  Reviewed with Dr Genesis Ybarra -- will order Hep C PCR to check if there is any viral load at this time  Called patient back and advised that PCR order placed, I told him  If he had told Dr Genesis Ybarra that he had Hep C and was treated already he would have ordered this test originally        ----- Message from Alana Montoya MD sent at 1/30/2019  5:51 AM EST -----  + hep C----refer GI

## 2019-02-18 ENCOUNTER — TELEPHONE (OUTPATIENT)
Dept: FAMILY MEDICINE CLINIC | Facility: CLINIC | Age: 29
End: 2019-02-18

## 2019-02-18 LAB
HCV RNA SERPL NAA+PROBE-ACNC: NORMAL IU/ML
TEST INFORMATION: NORMAL

## 2019-03-25 ENCOUNTER — OFFICE VISIT (OUTPATIENT)
Dept: FAMILY MEDICINE CLINIC | Facility: CLINIC | Age: 29
End: 2019-03-25
Payer: COMMERCIAL

## 2019-03-25 VITALS
HEART RATE: 88 BPM | SYSTOLIC BLOOD PRESSURE: 120 MMHG | RESPIRATION RATE: 16 BRPM | TEMPERATURE: 98.1 F | OXYGEN SATURATION: 98 % | HEIGHT: 75 IN | BODY MASS INDEX: 26.11 KG/M2 | DIASTOLIC BLOOD PRESSURE: 70 MMHG | WEIGHT: 210 LBS

## 2019-03-25 DIAGNOSIS — J01.00 ACUTE NON-RECURRENT MAXILLARY SINUSITIS: Primary | ICD-10-CM

## 2019-03-25 DIAGNOSIS — E66.3 OVERWEIGHT (BMI 25.0-29.9): ICD-10-CM

## 2019-03-25 PROCEDURE — 99213 OFFICE O/P EST LOW 20 MIN: CPT | Performed by: FAMILY MEDICINE

## 2019-03-25 RX ORDER — BENZONATATE 200 MG/1
200 CAPSULE ORAL 3 TIMES DAILY PRN
Qty: 20 CAPSULE | Refills: 0 | Status: SHIPPED | OUTPATIENT
Start: 2019-03-25 | End: 2020-03-17 | Stop reason: ALTCHOICE

## 2019-03-25 RX ORDER — AMOXICILLIN 500 MG/1
500 CAPSULE ORAL 3 TIMES DAILY
Qty: 30 CAPSULE | Refills: 0 | Status: SHIPPED | OUTPATIENT
Start: 2019-03-25 | End: 2019-04-04

## 2019-03-25 NOTE — PROGRESS NOTES
[address] [facility]        NAME: Yuly Saxena is a 25 y o  male  : 1990    MRN: [id number]  DATE: 2019  TIME: 8:38 AM    Assessment and Plan   Acute non-recurrent maxillary sinusitis [J01 00]  1  Acute non-recurrent maxillary sinusitis  benzonatate (TESSALON) 200 MG capsule    amoxicillin (AMOXIL) 500 mg capsule   2  Overweight (BMI 25 0-29  9)         No problem-specific Assessment & Plan notes found for this encounter  Patient Instructions     Patient Instructions   Take the antibiotic for 10 days  Over-the-counter medications and cough prescription as needed for symptoms  Work note provided  Recheck as needed  Chief Complaint     Chief Complaint   Patient presents with    Cold Like Symptoms     cold sxs         History of Present Illness       Ill X 5 days with sinus and cough symptoms  Sweats last night  Getting progressively worse  Cough is productive  Has chills but no documented fevers  History of viral pneumonias  Review of Systems   Review of Systems   Constitutional: Positive for chills, diaphoresis and fever  Negative for appetite change  HENT: Positive for sinus pressure and sinus pain  Negative for ear pain, rhinorrhea and sore throat  Eyes: Negative for discharge, redness and itching  Respiratory: Positive for cough  Negative for shortness of breath and wheezing  Cardiovascular: Negative for chest pain and palpitations  Rapid or slow heart rate   Gastrointestinal: Negative for abdominal pain, diarrhea, nausea and vomiting           Current Medications       Current Outpatient Medications:     amoxicillin (AMOXIL) 500 mg capsule, Take 1 capsule (500 mg total) by mouth 3 (three) times a day for 10 days please complete entire course of the antibiotic, Disp: 30 capsule, Rfl: 0    benzonatate (TESSALON) 200 MG capsule, Take 1 capsule (200 mg total) by mouth 3 (three) times a day as needed for cough, Disp: 20 capsule, Rfl: 0    Current Allergies     Allergies as of 03/25/2019    (No Known Allergies)            The following portions of the patient's history were reviewed and updated as appropriate: allergies, current medications, past family history, past medical history, past social history, past surgical history and problem list      History reviewed  No pertinent past medical history  Past Surgical History:   Procedure Laterality Date    CYST REMOVAL      L chest        Family History   Problem Relation Age of Onset    Hypertension Mother          Medications have been verified  Objective   /70   Pulse 88   Temp 98 1 °F (36 7 °C) (Oral)   Resp 16   Ht 6' 3" (1 905 m)   Wt 95 3 kg (210 lb)   SpO2 98%   BMI 26 25 kg/m²        Physical Exam     Physical Exam   Constitutional: He appears well-developed and well-nourished  No distress  HENT:   Head: Normocephalic and atraumatic  Right Ear: Tympanic membrane and external ear normal  No drainage  Left Ear: Tympanic membrane normal  No drainage  Nose: Mucosal edema, rhinorrhea and sinus tenderness present  Mouth/Throat: Posterior oropharyngeal edema present  No oropharyngeal exudate  Eyes: Conjunctivae and EOM are normal  Right eye exhibits no discharge  Left eye exhibits no discharge  Neck: Normal range of motion  Neck supple  No thyromegaly present  Cardiovascular: Normal rate, regular rhythm and normal heart sounds  Pulmonary/Chest: Effort normal  No respiratory distress  He has no wheezes  He has no rales  Abdominal: Soft  Lymphadenopathy:     He has no cervical adenopathy  Skin: Skin is warm and dry  BMI Counseling: Body mass index is 26 25 kg/m²  Discussed the patient's BMI with him  The BMI is above average  BMI counseling and education was provided to the patient  Nutrition recommendations include 3-5 servings of fruits/vegetables daily

## 2019-03-25 NOTE — LETTER
March 25, 2019     Patient: Dilip Julien   YOB: 1990   Date of Visit: 3/25/2019       To Whom it May Concern:    William Sanchez is under my professional care  He was seen in my office on 3/25/2019  He may return to work on 3/27/2019  Excuse work absence due to illness  May return 03/26/2019 if feeling improved       If you have any questions or concerns, please don't hesitate to call           Sincerely,          Mona Arevalo MD        CC: No Recipients

## 2019-03-25 NOTE — PATIENT INSTRUCTIONS
Take the antibiotic for 10 days  Over-the-counter medications and cough prescription as needed for symptoms  Work note provided  Recheck as needed

## 2019-04-04 ENCOUNTER — OFFICE VISIT (OUTPATIENT)
Dept: FAMILY MEDICINE CLINIC | Facility: CLINIC | Age: 29
End: 2019-04-04
Payer: COMMERCIAL

## 2019-04-04 VITALS
BODY MASS INDEX: 26.11 KG/M2 | SYSTOLIC BLOOD PRESSURE: 128 MMHG | WEIGHT: 210 LBS | RESPIRATION RATE: 16 BRPM | DIASTOLIC BLOOD PRESSURE: 76 MMHG | HEART RATE: 76 BPM | HEIGHT: 75 IN

## 2019-04-04 DIAGNOSIS — B36.0 TINEA VERSICOLOR: Primary | ICD-10-CM

## 2019-04-04 PROCEDURE — 1036F TOBACCO NON-USER: CPT | Performed by: FAMILY MEDICINE

## 2019-04-04 PROCEDURE — 99213 OFFICE O/P EST LOW 20 MIN: CPT | Performed by: FAMILY MEDICINE

## 2019-04-04 PROCEDURE — 3008F BODY MASS INDEX DOCD: CPT | Performed by: FAMILY MEDICINE

## 2019-04-04 RX ORDER — TERBINAFINE HYDROCHLORIDE 250 MG/1
250 TABLET ORAL DAILY
Qty: 10 TABLET | Refills: 0 | Status: SHIPPED | OUTPATIENT
Start: 2019-04-04 | End: 2019-04-14

## 2019-04-04 RX ORDER — PRENATAL VIT 91/IRON/FOLIC/DHA 28-975-200
COMBINATION PACKAGE (EA) ORAL 2 TIMES DAILY
Qty: 42 G | Refills: 1 | Status: SHIPPED | OUTPATIENT
Start: 2019-04-04 | End: 2019-07-23 | Stop reason: SDUPTHER

## 2019-07-10 ENCOUNTER — OFFICE VISIT (OUTPATIENT)
Dept: FAMILY MEDICINE CLINIC | Facility: CLINIC | Age: 29
End: 2019-07-10
Payer: COMMERCIAL

## 2019-07-10 VITALS
HEART RATE: 88 BPM | HEIGHT: 75 IN | BODY MASS INDEX: 26.11 KG/M2 | WEIGHT: 210 LBS | SYSTOLIC BLOOD PRESSURE: 126 MMHG | DIASTOLIC BLOOD PRESSURE: 88 MMHG | OXYGEN SATURATION: 98 %

## 2019-07-10 DIAGNOSIS — Z77.21 EXPOSURE TO BLOOD OR BODY FLUID: Primary | ICD-10-CM

## 2019-07-10 PROCEDURE — 99214 OFFICE O/P EST MOD 30 MIN: CPT | Performed by: FAMILY MEDICINE

## 2019-07-10 NOTE — PROGRESS NOTES
[address]  [facility]        NAME: Teddy Delgadillo is a 30 y o  male  : 1990    MRN: [id number]  DATE: July 10, 2019  TIME: 5:59 PM    Assessment and Plan   No primary diagnosis found  No diagnosis found  Patient Instructions     There are no Patient Instructions on file for this visit  Chief Complaint     Chief Complaint   Patient presents with    Labs Only     would like STD testing          History of Present Illness       Pt would like STD testing--asympt      Review of Systems   Review of Systems   Constitutional: Negative for fatigue, fever and unexpected weight change  HENT: Negative for congestion, sinus pain and sore throat  Eyes: Negative for visual disturbance  Respiratory: Negative for shortness of breath and wheezing  Cardiovascular: Negative for chest pain and palpitations  Gastrointestinal: Negative for abdominal pain, nausea and vomiting  Musculoskeletal: Negative  Negative for arthralgias and myalgias  Neurological: Negative for syncope, weakness and numbness  Psychiatric/Behavioral: Negative  Negative for confusion, dysphoric mood and suicidal ideas  Current Medications       Current Outpatient Medications:     benzonatate (TESSALON) 200 MG capsule, Take 1 capsule (200 mg total) by mouth 3 (three) times a day as needed for cough (Patient not taking: Reported on 2019), Disp: 20 capsule, Rfl: 0    terbinafine (LamISIL) 1 % cream, Apply topically 2 (two) times a day (Patient not taking: Reported on 7/10/2019), Disp: 42 g, Rfl: 1    Current Allergies     Allergies as of 07/10/2019    (No Known Allergies)            The following portions of the patient's history were reviewed and updated as appropriate: allergies, current medications, past family history, past medical history, past social history, past surgical history and problem list      No past medical history on file      Past Surgical History:   Procedure Laterality Date    CYST REMOVAL      L chest        Family History   Problem Relation Age of Onset    Hypertension Mother          Medications have been verified  Objective   /88   Pulse 88   Ht 6' 3" (1 905 m)   Wt 95 3 kg (210 lb)   SpO2 98%   BMI 26 25 kg/m²        Physical Exam     Physical Exam   Constitutional: He is oriented to person, place, and time  Vital signs are normal  He appears well-developed and well-nourished  HENT:   Right Ear: Ear canal normal  Tympanic membrane is not injected  Left Ear: Ear canal normal  Tympanic membrane is not injected  Nose: Nose normal    Mouth/Throat: Oropharynx is clear and moist    Eyes: Pupils are equal, round, and reactive to light  Conjunctivae and EOM are normal  Right eye exhibits no discharge  Left eye exhibits no discharge  Neck: Normal range of motion  Neck supple  No thyromegaly present  Cardiovascular: Normal rate, regular rhythm and normal heart sounds  No murmur heard  Pulmonary/Chest: Effort normal and breath sounds normal  No respiratory distress  He has no wheezes  Abdominal: Soft  Bowel sounds are normal  He exhibits no distension  There is no tenderness  Musculoskeletal: Normal range of motion  Lymphadenopathy:     He has no cervical adenopathy  Neurological: He is alert and oriented to person, place, and time  He has normal strength and normal reflexes  He is not disoriented  No sensory deficit  Gait normal    Skin: Skin is warm and dry  Psychiatric: He has a normal mood and affect   His speech is normal and behavior is normal  Judgment and thought content normal  Cognition and memory are normal

## 2019-07-23 ENCOUNTER — TELEPHONE (OUTPATIENT)
Dept: FAMILY MEDICINE CLINIC | Facility: CLINIC | Age: 29
End: 2019-07-23

## 2019-07-23 DIAGNOSIS — B36.0 TINEA VERSICOLOR: ICD-10-CM

## 2019-07-23 DIAGNOSIS — L30.9 ECZEMA, UNSPECIFIED TYPE: Primary | ICD-10-CM

## 2019-07-23 RX ORDER — TRIAMCINOLONE ACETONIDE 5 MG/G
CREAM TOPICAL 3 TIMES DAILY
Qty: 30 G | Refills: 3 | Status: SHIPPED | OUTPATIENT
Start: 2019-07-23 | End: 2020-03-17 | Stop reason: ALTCHOICE

## 2019-07-23 RX ORDER — PRENATAL VIT 91/IRON/FOLIC/DHA 28-975-200
COMBINATION PACKAGE (EA) ORAL 2 TIMES DAILY
Qty: 42 G | Refills: 1 | Status: SHIPPED | OUTPATIENT
Start: 2019-07-23 | End: 2020-03-17 | Stop reason: ALTCHOICE

## 2019-07-23 NOTE — TELEPHONE ENCOUNTER
Pt states he was suppose to get a steroid cream was seen 2 weeks ago     Refill lamisil as well     [facility] [place]

## 2019-07-31 ENCOUNTER — OFFICE VISIT (OUTPATIENT)
Dept: FAMILY MEDICINE CLINIC | Facility: CLINIC | Age: 29
End: 2019-07-31
Payer: COMMERCIAL

## 2019-07-31 VITALS
HEART RATE: 77 BPM | WEIGHT: 207 LBS | SYSTOLIC BLOOD PRESSURE: 124 MMHG | TEMPERATURE: 97.4 F | HEIGHT: 75 IN | BODY MASS INDEX: 25.74 KG/M2 | DIASTOLIC BLOOD PRESSURE: 88 MMHG | OXYGEN SATURATION: 98 %

## 2019-07-31 DIAGNOSIS — J02.8 ACUTE PHARYNGITIS DUE TO OTHER SPECIFIED ORGANISMS: Primary | ICD-10-CM

## 2019-07-31 LAB — S PYO AG THROAT QL: NEGATIVE

## 2019-07-31 PROCEDURE — 3008F BODY MASS INDEX DOCD: CPT | Performed by: FAMILY MEDICINE

## 2019-07-31 PROCEDURE — 87880 STREP A ASSAY W/OPTIC: CPT | Performed by: FAMILY MEDICINE

## 2019-07-31 PROCEDURE — 99213 OFFICE O/P EST LOW 20 MIN: CPT | Performed by: FAMILY MEDICINE

## 2019-07-31 NOTE — LETTER
July 31, 2019     Patient: Shonda Cao   YOB: 1990   Date of Visit: 7/31/2019       To Whom it May Concern:    Merrill Downs is under my professional care  He was seen in my office on 7/31/2019  He may return to school on 8/2/2019   Excuse work absence due to illness       If you have any questions or concerns, please don't hesitate to call           Sincerely,          Ashley Castaneda MD        CC: No Recipients

## 2019-07-31 NOTE — PROGRESS NOTES
[address] [facility]         NAME: Stoney Santos is a 30 y o  male  : 1990    MRN: [id number]  DATE: 2019  TIME: 1:28 PM    Assessment and Plan   Acute pharyngitis due to other specified organisms [J02 8]  1  Acute pharyngitis due to other specified organisms  POCT rapid strepA       No problem-specific Assessment & Plan notes found for this encounter  Patient Instructions     Patient Instructions   The rapid strep test is is negative  This which is just a viral etiology for your sore throat  My advice would be to continue take Tylenol or ibuprofen as needed  Maintain your hydration well  This could be a recurrence of your mono although I do not think that is the case  I would see with this does over the next 2-3 days  If things persist we could do a CBC, Lyme titer, and EBV virus titers to further investigate  If you start seeing more enlarged mm lymph nodes, tonsils or white spots on the tonsils are becoming more prevalent call for possible antibiotic  Chief Complaint     Chief Complaint   Patient presents with    Sore Throat     for 2 days     Muscle Pain    Earache    Fatigue    Fever         History of Present Illness       Patient has been ill for the last 2-3 days  Muscle aches, headaches, severe sore throat, and chills  Some low back pain  There are some swollen glands in the neck  No sick exposures recognized  No rashes or bites reported  Review of Systems   Review of Systems   Constitutional: Positive for chills and fever  Negative for appetite change and diaphoresis  HENT: Positive for sore throat and trouble swallowing  Negative for ear pain, rhinorrhea and sinus pressure  Eyes: Negative for discharge, redness and itching  Respiratory: Negative for cough, shortness of breath and wheezing  Cardiovascular: Negative for chest pain and palpitations          Rapid or slow heart rate   Gastrointestinal: Negative for abdominal pain, diarrhea, nausea and vomiting  Neurological: Positive for headaches  Current Medications       Current Outpatient Medications:     benzonatate (TESSALON) 200 MG capsule, Take 1 capsule (200 mg total) by mouth 3 (three) times a day as needed for cough (Patient not taking: Reported on 4/4/2019), Disp: 20 capsule, Rfl: 0    terbinafine (LamISIL) 1 % cream, Apply topically 2 (two) times a day, Disp: 42 g, Rfl: 1    triamcinolone (KENALOG) 0 5 % cream, Apply topically 3 (three) times a day, Disp: 30 g, Rfl: 3    Current Allergies     Allergies as of 07/31/2019    (No Known Allergies)            The following portions of the patient's history were reviewed and updated as appropriate: allergies, current medications, past family history, past medical history, past social history, past surgical history and problem list      History reviewed  No pertinent past medical history  Past Surgical History:   Procedure Laterality Date    CYST REMOVAL      L chest        Family History   Problem Relation Age of Onset    Hypertension Mother          Medications have been verified  Objective   /88   Pulse 77   Temp (!) 97 4 °F (36 3 °C)   Ht 6' 3" (1 905 m)   Wt 93 9 kg (207 lb)   SpO2 98%   BMI 25 87 kg/m²        Physical Exam     Physical Exam   Constitutional: He appears well-developed and well-nourished  No distress  HENT:   Head: Normocephalic and atraumatic  Right Ear: Tympanic membrane and external ear normal  No drainage  Left Ear: Tympanic membrane normal  No drainage  Nose: Right sinus exhibits no maxillary sinus tenderness  Left sinus exhibits no maxillary sinus tenderness  Mouth/Throat: Oropharyngeal exudate and posterior oropharyngeal erythema present  Tonsils are 2+ on the right  Tonsils are 2+ on the left  Eyes: Conjunctivae and EOM are normal  Right eye exhibits no discharge  Left eye exhibits no discharge  Neck: Normal range of motion  Neck supple   No thyromegaly present  Cardiovascular: Normal rate, regular rhythm and normal heart sounds  Pulmonary/Chest: Effort normal  No respiratory distress  He has no wheezes  He has no rales  Abdominal: Soft  Lymphadenopathy:     He has cervical adenopathy  Skin: Skin is warm and dry

## 2019-07-31 NOTE — PATIENT INSTRUCTIONS
The rapid strep test is is negative  This which is just a viral etiology for your sore throat  My advice would be to continue take Tylenol or ibuprofen as needed  Maintain your hydration well  This could be a recurrence of your mono although I do not think that is the case  I would see with this does over the next 2-3 days  If things persist we could do a CBC, Lyme titer, and EBV virus titers to further investigate  If you start seeing more enlarged mm lymph nodes, tonsils or white spots on the tonsils are becoming more prevalent call for possible antibiotic

## 2019-08-01 ENCOUNTER — HOSPITAL ENCOUNTER (EMERGENCY)
Facility: HOSPITAL | Age: 29
Discharge: HOME | End: 2019-08-01
Attending: EMERGENCY MEDICINE | Admitting: EMERGENCY MEDICINE
Payer: COMMERCIAL

## 2019-08-01 ENCOUNTER — APPOINTMENT (EMERGENCY)
Dept: RADIOLOGY | Facility: HOSPITAL | Age: 29
End: 2019-08-01
Attending: EMERGENCY MEDICINE
Payer: COMMERCIAL

## 2019-08-01 VITALS
DIASTOLIC BLOOD PRESSURE: 51 MMHG | HEIGHT: 75 IN | HEART RATE: 79 BPM | OXYGEN SATURATION: 95 % | SYSTOLIC BLOOD PRESSURE: 104 MMHG | TEMPERATURE: 102.5 F | WEIGHT: 210 LBS | BODY MASS INDEX: 26.11 KG/M2 | RESPIRATION RATE: 17 BRPM

## 2019-08-01 DIAGNOSIS — J02.9 PHARYNGITIS: Primary | ICD-10-CM

## 2019-08-01 DIAGNOSIS — M79.10 MYALGIA: ICD-10-CM

## 2019-08-01 DIAGNOSIS — R50.9 FEBRILE ILLNESS: ICD-10-CM

## 2019-08-01 LAB
ALBUMIN SERPL BCP-MCNC: 3.9 G/DL (ref 3.5–5)
ALP SERPL-CCNC: 54 U/L (ref 46–116)
ALT SERPL W P-5'-P-CCNC: 56 U/L (ref 12–78)
ANION GAP SERPL CALCULATED.3IONS-SCNC: 12 MMOL/L (ref 4–13)
AST SERPL W P-5'-P-CCNC: 37 U/L (ref 5–45)
BACTERIA UR QL AUTO: ABNORMAL /HPF
BASOPHILS # BLD AUTO: 0.03 THOUSANDS/ΜL (ref 0–0.1)
BASOPHILS NFR BLD AUTO: 0 % (ref 0–1)
BILIRUB SERPL-MCNC: 0.5 MG/DL (ref 0.2–1)
BILIRUB UR QL STRIP: NEGATIVE
BUN SERPL-MCNC: 12 MG/DL (ref 5–25)
CALCIUM SERPL-MCNC: 9.3 MG/DL (ref 8.3–10.1)
CHLORIDE SERPL-SCNC: 100 MMOL/L (ref 100–108)
CLARITY UR: CLEAR
CO2 SERPL-SCNC: 26 MMOL/L (ref 21–32)
COLOR UR: YELLOW
CREAT SERPL-MCNC: 1.23 MG/DL (ref 0.6–1.3)
EOSINOPHIL # BLD AUTO: 0.02 THOUSAND/ΜL (ref 0–0.61)
EOSINOPHIL NFR BLD AUTO: 0 % (ref 0–6)
ERYTHROCYTE [DISTWIDTH] IN BLOOD BY AUTOMATED COUNT: 14.1 % (ref 11.6–15.1)
GFR SERPL CREATININE-BSD FRML MDRD: 78 ML/MIN/1.73SQ M
GLUCOSE SERPL-MCNC: 120 MG/DL (ref 65–140)
GLUCOSE UR STRIP-MCNC: NEGATIVE MG/DL
HCT VFR BLD AUTO: 49 % (ref 36.5–49.3)
HGB BLD-MCNC: 16.5 G/DL (ref 12–17)
HGB UR QL STRIP.AUTO: ABNORMAL
IMM GRANULOCYTES # BLD AUTO: 0.04 THOUSAND/UL (ref 0–0.2)
IMM GRANULOCYTES NFR BLD AUTO: 0 % (ref 0–2)
KETONES UR STRIP-MCNC: NEGATIVE MG/DL
LEUKOCYTE ESTERASE UR QL STRIP: NEGATIVE
LIPASE SERPL-CCNC: 112 U/L (ref 73–393)
LYMPHOCYTES # BLD AUTO: 0.59 THOUSANDS/ΜL (ref 0.6–4.47)
LYMPHOCYTES NFR BLD AUTO: 5 % (ref 14–44)
MCH RBC QN AUTO: 29.9 PG (ref 26.8–34.3)
MCHC RBC AUTO-ENTMCNC: 33.7 G/DL (ref 31.4–37.4)
MCV RBC AUTO: 89 FL (ref 82–98)
MONOCYTES # BLD AUTO: 0.93 THOUSAND/ΜL (ref 0.17–1.22)
MONOCYTES NFR BLD AUTO: 8 % (ref 4–12)
NEUTROPHILS # BLD AUTO: 10.06 THOUSANDS/ΜL (ref 1.85–7.62)
NEUTS SEG NFR BLD AUTO: 87 % (ref 43–75)
NITRITE UR QL STRIP: NEGATIVE
NON-SQ EPI CELLS URNS QL MICRO: ABNORMAL /HPF
NRBC BLD AUTO-RTO: 0 /100 WBCS
PH UR STRIP.AUTO: 8 [PH]
PLATELET # BLD AUTO: 269 THOUSANDS/UL (ref 149–390)
PMV BLD AUTO: 9.9 FL (ref 8.9–12.7)
POTASSIUM SERPL-SCNC: 4.1 MMOL/L (ref 3.5–5.3)
PROT SERPL-MCNC: 8.4 G/DL (ref 6.4–8.2)
PROT UR STRIP-MCNC: ABNORMAL MG/DL
RBC # BLD AUTO: 5.52 MILLION/UL (ref 3.88–5.62)
RBC #/AREA URNS AUTO: ABNORMAL /HPF
SODIUM SERPL-SCNC: 138 MMOL/L (ref 136–145)
SP GR UR STRIP.AUTO: 1.01 (ref 1–1.03)
UROBILINOGEN UR QL STRIP.AUTO: 0.2 E.U./DL
WBC # BLD AUTO: 11.67 THOUSAND/UL (ref 4.31–10.16)
WBC #/AREA URNS AUTO: ABNORMAL /HPF

## 2019-08-01 PROCEDURE — 71046 X-RAY EXAM CHEST 2 VIEWS: CPT

## 2019-08-01 PROCEDURE — 36415 COLL VENOUS BLD VENIPUNCTURE: CPT | Performed by: EMERGENCY MEDICINE

## 2019-08-01 PROCEDURE — 96361 HYDRATE IV INFUSION ADD-ON: CPT

## 2019-08-01 PROCEDURE — 81001 URINALYSIS AUTO W/SCOPE: CPT | Performed by: EMERGENCY MEDICINE

## 2019-08-01 PROCEDURE — 86618 LYME DISEASE ANTIBODY: CPT | Performed by: EMERGENCY MEDICINE

## 2019-08-01 PROCEDURE — 83690 ASSAY OF LIPASE: CPT | Performed by: EMERGENCY MEDICINE

## 2019-08-01 PROCEDURE — 99283 EMERGENCY DEPT VISIT LOW MDM: CPT

## 2019-08-01 PROCEDURE — 96374 THER/PROPH/DIAG INJ IV PUSH: CPT

## 2019-08-01 PROCEDURE — 96375 TX/PRO/DX INJ NEW DRUG ADDON: CPT

## 2019-08-01 PROCEDURE — 99284 EMERGENCY DEPT VISIT MOD MDM: CPT | Performed by: EMERGENCY MEDICINE

## 2019-08-01 PROCEDURE — 80053 COMPREHEN METABOLIC PANEL: CPT | Performed by: EMERGENCY MEDICINE

## 2019-08-01 PROCEDURE — 85025 COMPLETE CBC W/AUTO DIFF WBC: CPT | Performed by: EMERGENCY MEDICINE

## 2019-08-01 RX ORDER — KETOROLAC TROMETHAMINE 30 MG/ML
15 INJECTION, SOLUTION INTRAMUSCULAR; INTRAVENOUS ONCE
Status: COMPLETED | OUTPATIENT
Start: 2019-08-01 | End: 2019-08-01

## 2019-08-01 RX ORDER — ONDANSETRON 2 MG/ML
4 INJECTION INTRAMUSCULAR; INTRAVENOUS ONCE
Status: COMPLETED | OUTPATIENT
Start: 2019-08-01 | End: 2019-08-01

## 2019-08-01 RX ORDER — ACETAMINOPHEN 325 MG/1
975 TABLET ORAL ONCE
Status: COMPLETED | OUTPATIENT
Start: 2019-08-01 | End: 2019-08-01

## 2019-08-01 RX ORDER — NAPROXEN 500 MG/1
500 TABLET ORAL 2 TIMES DAILY WITH MEALS
Qty: 10 TABLET | Refills: 0 | Status: SHIPPED | OUTPATIENT
Start: 2019-08-01 | End: 2020-03-17 | Stop reason: ALTCHOICE

## 2019-08-01 RX ADMIN — ONDANSETRON 4 MG: 2 INJECTION INTRAMUSCULAR; INTRAVENOUS at 08:09

## 2019-08-01 RX ADMIN — SODIUM CHLORIDE 2000 ML: 0.9 INJECTION, SOLUTION INTRAVENOUS at 08:06

## 2019-08-01 RX ADMIN — KETOROLAC TROMETHAMINE 15 MG: 30 INJECTION, SOLUTION INTRAMUSCULAR; INTRAVENOUS at 08:09

## 2019-08-01 RX ADMIN — Medication 10 MG: at 09:10

## 2019-08-01 RX ADMIN — ACETAMINOPHEN 975 MG: 325 TABLET, FILM COATED ORAL at 08:08

## 2019-08-01 NOTE — ED PROVIDER NOTES
History  Chief Complaint   Patient presents with    Flu Symptoms     pt states since monday he has had sore throat, fever, chills, headache, body aches, fatigue since monday  states he saw family doctor and was only tested for strep  has been taking OTC medicine but hasnt been feeling any better      HPI     49-year-old male presenting with sore throat myalgias dull achy headache feeling fatigued this all began Monday  Patient saw his PCP and had a negative strep test   Patient has been taking Tylenol Motrin over-the-counter not help the symptoms  Patient is here because of the progressing symptoms  Subjective fevers at home  Worse sore throat swelling  No voice change drooling  Patient also admits to intermittent nonproductive cough  No smoking history  Patient Headache is coming going dull currently a 5/10  No phonophobia photophobia  Began slowly no thunderclap  Patient denies neck pain neck stiffness  No sick contacts at home  Patient former IV drug user  Has not used drugs in a couple of years  Patient does admit to a rash to his abdomen  Patient states the rash is cleared over last week  Patient works in construction  Patient denies chills rigors neck pain neck stiffness chest pain palpitations shortness of breath cough pleurisy abdominal pain nausea vomiting diarrhea constipation  Patient states he has been peeing more frequently  Patient denies other urinary symptoms  No flank pain  Prior to Admission Medications   Prescriptions Last Dose Informant Patient Reported?  Taking?   benzonatate (TESSALON) 200 MG capsule   No No   Sig: Take 1 capsule (200 mg total) by mouth 3 (three) times a day as needed for cough   Patient not taking: Reported on 4/4/2019   terbinafine (LamISIL) 1 % cream   No No   Sig: Apply topically 2 (two) times a day   triamcinolone (KENALOG) 0 5 % cream   No No   Sig: Apply topically 3 (three) times a day      Facility-Administered Medications: None       Past Medical History:   Diagnosis Date    Hepatitis C virus infection cured after antiviral drug therapy        Past Surgical History:   Procedure Laterality Date    CYST REMOVAL      L chest        Family History   Problem Relation Age of Onset    Hypertension Mother      I have reviewed and agree with the history as documented  Social History     Tobacco Use    Smoking status: Former Smoker     Types: Cigarettes    Smokeless tobacco: Never Used    Tobacco comment: quit 2015   Substance Use Topics    Alcohol use: No    Drug use: No     Comment: hx opiate abuse        Review of Systems   Constitutional: Positive for fatigue and fever  Negative for activity change, appetite change, chills, diaphoresis and unexpected weight change  HENT: Positive for rhinorrhea and sore throat  Negative for congestion, ear discharge, ear pain, facial swelling, hearing loss, nosebleeds, postnasal drip, sinus pressure, sneezing, tinnitus and trouble swallowing  Eyes: Negative for photophobia, pain, redness, itching and visual disturbance  Respiratory: Positive for cough  Negative for chest tightness, shortness of breath, wheezing and stridor  Cardiovascular: Negative for chest pain, palpitations and leg swelling  Gastrointestinal: Negative for abdominal distention, abdominal pain, blood in stool, constipation, diarrhea, nausea and vomiting  Endocrine: Negative for polydipsia and polyuria  Genitourinary: Negative for decreased urine volume, difficulty urinating, dysuria, enuresis, flank pain, frequency, hematuria and urgency  Musculoskeletal: Positive for myalgias  Negative for arthralgias, back pain, gait problem, neck pain and neck stiffness  Skin: Negative for rash and wound  Allergic/Immunologic: Negative for immunocompromised state  Neurological: Negative for dizziness, seizures, syncope, facial asymmetry, speech difficulty, weakness, light-headedness, numbness and headaches     Hematological: Negative for adenopathy  Psychiatric/Behavioral: Negative for agitation, behavioral problems, confusion, dysphoric mood, hallucinations, self-injury and suicidal ideas  The patient is not nervous/anxious and is not hyperactive  All other systems reviewed and are negative  Physical Exam  Physical Exam   Constitutional: He is oriented to person, place, and time  He appears well-developed and well-nourished  No distress  HENT:   Head: Normocephalic and atraumatic  Right Ear: External ear normal    Left Ear: External ear normal    Nose: Nose normal    Mouth/Throat: Oropharynx is clear and moist  No oropharyngeal exudate  Erythematous posterior oropharynx without exudates  Patient is able to move neck without difficulty no lymphadenopathy  There is no posterior pharynx swelling   Eyes: Pupils are equal, round, and reactive to light  Conjunctivae and EOM are normal  Right eye exhibits no discharge  Left eye exhibits no discharge  No scleral icterus  Neck: Normal range of motion  Neck supple  No JVD present  No tracheal deviation present  No thyromegaly present  Cardiovascular: Normal rate, regular rhythm, normal heart sounds and intact distal pulses  No murmur heard  Pulmonary/Chest: Effort normal and breath sounds normal  No stridor  No respiratory distress  He has no wheezes  He has no rales  Abdominal: Soft  Bowel sounds are normal  He exhibits no distension and no mass  There is no tenderness  There is no rebound and no guarding  Musculoskeletal: Normal range of motion  He exhibits no edema, tenderness or deformity  Lymphadenopathy:     He has no cervical adenopathy  Neurological: He is alert and oriented to person, place, and time  No cranial nerve deficit  He exhibits normal muscle tone  Coordination normal    Skin: Skin is warm and dry  Capillary refill takes less than 2 seconds  No rash noted  He is not diaphoretic  No erythema  Psychiatric: He has a normal mood and affect   His behavior is normal  Judgment and thought content normal    Nursing note and vitals reviewed        Vital Signs  ED Triage Vitals   Temperature Pulse Respirations Blood Pressure SpO2   08/01/19 0758 08/01/19 0758 08/01/19 0758 08/01/19 0804 08/01/19 0758   (!) 102 5 °F (39 2 °C) 103 19 150/70 97 %      Temp Source Heart Rate Source Patient Position - Orthostatic VS BP Location FiO2 (%)   08/01/19 0758 08/01/19 0758 -- -- --   Tympanic Monitor         Pain Score       08/01/19 0758       9           Vitals:    08/01/19 0804 08/01/19 0813 08/01/19 0815 08/01/19 0900   BP: 150/70  123/56 104/51   Pulse:  90 85 79         Visual Acuity      ED Medications  Medications   sodium chloride 0 9 % bolus 2,000 mL (0 mL Intravenous Stopped 8/1/19 0916)   ketorolac (TORADOL) injection 15 mg (15 mg Intravenous Given 8/1/19 0809)   acetaminophen (TYLENOL) tablet 975 mg (975 mg Oral Given 8/1/19 0808)   ondansetron (ZOFRAN) injection 4 mg (4 mg Intravenous Given 8/1/19 0809)   dexamethasone 10 mg/mL oral liquid 10 mg 1 mL (10 mg Oral Given 8/1/19 0910)       Diagnostic Studies  Results Reviewed     Procedure Component Value Units Date/Time    Urine Microscopic [[id number]]  (Abnormal) Collected:  08/01/19 0845    Lab Status:  Final result Specimen:  Urine, Clean Catch Updated:  08/01/19 0923     RBC, UA 2-4 /hpf      WBC, UA 0-1 /hpf      Epithelial Cells None Seen /hpf      Bacteria, UA None Seen /hpf     UA w Reflex to Microscopic w Reflex to Culture [[id number]]  (Abnormal) Collected:  08/01/19 0845    Lab Status:  Final result Specimen:  Urine, Clean Catch Updated:  08/01/19 0906     Color, UA Yellow     Clarity, UA Clear     Specific Gravity, UA 1 015     pH, UA 8 0     Leukocytes, UA Negative     Nitrite, UA Negative     Protein, UA 30 (1+) mg/dl      Glucose, UA Negative mg/dl      Ketones, UA Negative mg/dl      Urobilinogen, UA 0 2 E U /dl      Bilirubin, UA Negative     Blood, UA Trace-lysed    Comprehensive metabolic panel [[id number]]  (Abnormal) Collected:  08/01/19 0808    Lab Status:  Final result Specimen:  Blood from Arm, Right Updated:  08/01/19 0839     Sodium 138 mmol/L      Potassium 4 1 mmol/L      Chloride 100 mmol/L      CO2 26 mmol/L      ANION GAP 12 mmol/L      BUN 12 mg/dL      Creatinine 1 23 mg/dL      Glucose 120 mg/dL      Calcium 9 3 mg/dL      AST 37 U/L      ALT 56 U/L      Alkaline Phosphatase 54 U/L      Total Protein 8 4 g/dL      Albumin 3 9 g/dL      Total Bilirubin 0 50 mg/dL      eGFR 78 ml/min/1 73sq m     Narrative:       National Kidney Disease Foundation guidelines for Chronic Kidney Disease (CKD):     Stage 1 with normal or high GFR (GFR > 90 mL/min/1 73 square meters)    Stage 2 Mild CKD (GFR = 60-89 mL/min/1 73 square meters)    Stage 3A Moderate CKD (GFR = 45-59 mL/min/1 73 square meters)    Stage 3B Moderate CKD (GFR = 30-44 mL/min/1 73 square meters)    Stage 4 Severe CKD (GFR = 15-29 mL/min/1 73 square meters)    Stage 5 End Stage CKD (GFR <15 mL/min/1 73 square meters)  Note: GFR calculation is accurate only with a steady state creatinine    Lipase [[id number]]  (Normal) Collected:  08/01/19 0808    Lab Status:  Final result Specimen:  Blood from Arm, Right Updated:  08/01/19 0839     Lipase 112 u/L     CBC and differential [[id number]]  (Abnormal) Collected:  08/01/19 0808    Lab Status:  Final result Specimen:  Blood from Arm, Right Updated:  08/01/19 0825     WBC 11 67 Thousand/uL      RBC 5 52 Million/uL      Hemoglobin 16 5 g/dL      Hematocrit 49 0 %      MCV 89 fL      MCH 29 9 pg      MCHC 33 7 g/dL      RDW 14 1 %      MPV 9 9 fL      Platelets 078 Thousands/uL      nRBC 0 /100 WBCs      Neutrophils Relative 87 %      Immat GRANS % 0 %      Lymphocytes Relative 5 %      Monocytes Relative 8 %      Eosinophils Relative 0 %      Basophils Relative 0 %      Neutrophils Absolute 10 06 Thousands/µL      Immature Grans Absolute 0 04 Thousand/uL      Lymphocytes Absolute 0 59 Thousands/µL      Monocytes Absolute 0 93 Thousand/µL      Eosinophils Absolute 0 02 Thousand/µL      Basophils Absolute 0 03 Thousands/µL     Lyme Antibody Profile with reflex to [facility] [[id number]] Collected:  08/01/19 6851    Lab Status: In process Specimen:  Blood from Arm, Right Updated:  08/01/19 0820                 XR chest 2 views   ED Interpretation by Louie Silva DO (08/01 8419)   No effusions or consolidations bilaterally      Final Result by Divya Bueno MD (08/01 6994)      No acute cardiopulmonary disease  Workstation performed: [id number]                    Procedures  Procedures       ED Course  ED Course as of Aug 01 1018   Thu Aug 01, 2019   0848 Fluids given   Temperature(!): 102 5 °F (39 2 °C)   0849 Muscular male   Creatinine: 1 23                               MDM  Number of Diagnoses or Management Options  Febrile illness:   Myalgia:   Pharyngitis:   Diagnosis management comments: 13-year-old male presenting with febrile illness, pharyngitis myalgias  Lab work did not show any anemia or leukocytosis  UA did not show any UTI  Chest x-ray not suggestive of pneumonia  Patient given supportive care  Patient felt better  Patient tolerated p o  Patient ambulated without difficulty  Anti-inflammatories for home  Patient will follow up with PCP  Work note given  ED return precautions discussed he demonstrates understanding  Offered HIV testing  Patient did not want this        Disposition  Final diagnoses:   Pharyngitis   Myalgia   Febrile illness     Time reflects when diagnosis was documented in both MDM as applicable and the Disposition within this note     Time User Action Codes Description Comment    8/1/2019  9:08 AM Johnnie Madden Chris Add [J02 9] Pharyngitis     8/1/2019  9:08 AM Johnnie Madden Add [M79 10] Myalgia     8/1/2019  9:08 AM Johnnie Madden Add [R50 9] Febrile illness       ED Disposition     ED Disposition Condition Date/Time Comment    Discharge Stable Thu Aug 1, 2019  9:08 AM Anderson Cole discharge to home/self care  Return precautions were discussed with patient  Patient understands when to return to  Emergency department  Patient agrees to discharge plan and follow up care  Follow-up Information     Follow up With Specialties Details Why Contact Info Additional Information    Anastacia Lanes, MD Family Medicine Go in 2 days  [address]"" Emergency Department Emergency Medicine Go to  As needed, If symptoms worsen [address] [facility] [address] ED, [facility], [place], [place], [address]          Discharge Medication List as of 8/1/2019  9:09 AM      START taking these medications    Details   naproxen (NAPROSYN) 500 mg tablet Take 1 tablet (500 mg total) by mouth 2 (two) times a day with meals for 5 days, Starting Thu 8/1/2019, Until Tue 8/6/2019, Print         CONTINUE these medications which have NOT CHANGED    Details   benzonatate (TESSALON) 200 MG capsule Take 1 capsule (200 mg total) by mouth 3 (three) times a day as needed for cough, Starting Mon 3/25/2019, Normal      terbinafine (LamISIL) 1 % cream Apply topically 2 (two) times a day, Starting Tue 7/23/2019, Normal      triamcinolone (KENALOG) 0 5 % cream Apply topically 3 (three) times a day, Starting Tue 7/23/2019, Normal           No discharge procedures on file      ED Provider  Electronically Signed by           Catina Garcia DO  08/01/19 1202

## 2019-08-01 NOTE — ED NOTES
Patient aware urine sample is needed  Unable to provide sample at this time  Mother at bedside  Call bell within reach   No requests at this time     Lord Kathy RN  08/01/19 3683

## 2019-08-03 LAB
B BURGDOR IGG SER IA-ACNC: 0.17
B BURGDOR IGM SER IA-ACNC: 0.29

## 2019-08-05 ENCOUNTER — VBI (OUTPATIENT)
Dept: FAMILY MEDICINE CLINIC | Facility: CLINIC | Age: 29
End: 2019-08-05

## 2019-08-05 NOTE — LETTER
[address]  [facility] [address]    Date: 08/07/19    Armin Dubose  [address]  [facility] [address]    Dear Kar Levels:                                                                                                                Thank you for choosing [facility] emergency department for care  As your primary care provider we want to make sure that your ongoing medical care is being addressed  If you require follow up care as a result of your emergency department visit, there are a few things we would like you to know  As part of our continuing commitment to caring for our patient, we have added more same day appointments and have extended our office hours to meet your medical needs  After hours, on-call physicians are available via telephone  We encourage you to contact our office prior to seeking treatment to discuss your symptoms with our medical staff  Together, we can determine the correct course of action  A majority of non-emergent conditions such as: common cold, flu-like symptoms, fevers, strains/sprains, dislocations, minor burns, cuts and animal bites can be treated at [facility] facilities  Diagnostic testing is available at some sites  Of course, if you are experiencing a life threatening medical emergency call 911 or proceed directly to the nearest emergency room      Your nearest [facility] is conveniently located at:    Landen SMITH [address], [address] [place] [address] Road  [phone]    Sincerely,    [address] [facility]  Dept: [phone]

## 2019-08-05 NOTE — TELEPHONE ENCOUNTER
Khloe Montes    ED Visit Information     Ed visit date: 8/1/2019  Diagnosis Description: Pharyngitis; Myalgia; Febrile illness  In Network? Yes [address]  Discharge status: Home  Discharged with meds ? Yes  Number of ED visits to date: 1  ED Severity:n/a     Outreach Information    Outreach successful: Yes 2  Date letter mailed:8/7/2019  Date Finalized:8/7/2019    Care Coordination    Follow up appointment with pcp: no No ED f/u appt scheduled  Transportation issues ? NA    [employer] type:  7 Day Outreach  Emergent necessity warranted by diagnosis:  No  [facility] PCP:  Yes  Transportation:  Friend/Family Transport  08/05/2019 01:42 PM Phone ([employer]) Armond Garcia (Self) [phone] (H)   Left Message - requesting a call back  Unable to reach patient regarding recent ED visit on 8/1 for Pharyngitis; Myalgia; Febrile illness  Patient was discharged without medication (naproxen) and advised to follow up with his PCP within 2 days of visit  2nd attempt will be made on 8/7 08/07/2019 10:49 AM Phone ([employer]) Armond Garcia (Self) [phone] (H)   Left Message - requesting a call back  Unable to reach patient regarding recent ED visit on 8/1 for Pharyngitis; Myalgia; Febrile illness  Patient was discharged without medication (naproxen) and advised to follow up with his PCP within 2 days of visit  Letter generated and mailed

## 2019-12-13 ENCOUNTER — TELEPHONE (OUTPATIENT)
Dept: FAMILY MEDICINE CLINIC | Facility: CLINIC | Age: 29
End: 2019-12-13

## 2019-12-13 DIAGNOSIS — H10.023 PINK EYE DISEASE OF BOTH EYES: Primary | ICD-10-CM

## 2019-12-13 RX ORDER — TOBRAMYCIN 3 MG/ML
1 SOLUTION/ DROPS OPHTHALMIC
Qty: 1 BOTTLE | Refills: 0 | Status: SHIPPED | OUTPATIENT
Start: 2019-12-13 | End: 2020-03-17 | Stop reason: ALTCHOICE

## 2019-12-13 NOTE — TELEPHONE ENCOUNTER
Approve      Pt states has had for 4 days now, can not wear contacts, green, crusty eyes    [facility]

## 2019-12-24 ENCOUNTER — OFFICE VISIT (OUTPATIENT)
Dept: FAMILY MEDICINE CLINIC | Facility: CLINIC | Age: 29
End: 2019-12-24
Payer: COMMERCIAL

## 2019-12-24 VITALS
OXYGEN SATURATION: 97 % | TEMPERATURE: 97.2 F | DIASTOLIC BLOOD PRESSURE: 76 MMHG | SYSTOLIC BLOOD PRESSURE: 126 MMHG | HEART RATE: 92 BPM

## 2019-12-24 DIAGNOSIS — Z13.9 SCREENING FOR CONDITION: ICD-10-CM

## 2019-12-24 DIAGNOSIS — Z00.00 WELLNESS EXAMINATION: ICD-10-CM

## 2019-12-24 DIAGNOSIS — R53.83 FATIGUE, UNSPECIFIED TYPE: ICD-10-CM

## 2019-12-24 DIAGNOSIS — H10.31 ACUTE CONJUNCTIVITIS OF RIGHT EYE, UNSPECIFIED ACUTE CONJUNCTIVITIS TYPE: Primary | ICD-10-CM

## 2019-12-24 PROCEDURE — 99213 OFFICE O/P EST LOW 20 MIN: CPT | Performed by: FAMILY MEDICINE

## 2019-12-24 RX ORDER — TOBRAMYCIN AND DEXAMETHASONE 3; 1 MG/ML; MG/ML
1 SUSPENSION/ DROPS OPHTHALMIC
Qty: 5 ML | Refills: 0 | Status: SHIPPED | OUTPATIENT
Start: 2019-12-24 | End: 2020-03-17 | Stop reason: ALTCHOICE

## 2019-12-24 NOTE — PROGRESS NOTES
[address]  [facility]        NAME: Ibeth Lundy is a 30 y o  male  : 1990    MRN: [id number]  DATE: 2019  TIME: 8:36 AM    Assessment and Plan   Acute conjunctivitis of right eye, unspecified acute conjunctivitis type [H10 31]  1  Acute conjunctivitis of right eye, unspecified acute conjunctivitis type  tobramycin-dexamethasone (TOBRADEX) ophthalmic suspension    neomycin-polymyxin-hydrocortisone (CORTISPORIN) otic solution   2  Fatigue, unspecified type  Testosterone    Testosterone   3  Screening for condition  Comprehensive metabolic panel    Lipid Panel with Direct LDL reflex    Testosterone    Comprehensive metabolic panel    Lipid Panel with Direct LDL reflex    Testosterone   4  Wellness examination           Patient Instructions     Patient Instructions   Trial tobradex---sched eye doc appt          Chief Complaint     Chief Complaint   Patient presents with    Pressure Behind the Eyes     right eye for past week, right ear concern         History of Present Illness       C/o R eye---discharge/feeling of pressure--no visual change--no resp to tobramycin--1 wk      Review of Systems   Review of Systems   Constitutional: Negative for fatigue, fever and unexpected weight change  HENT: Negative for congestion, sinus pressure and sore throat  Eyes: Positive for discharge, redness and itching  Negative for photophobia, pain and visual disturbance  Respiratory: Negative for shortness of breath and wheezing  Cardiovascular: Negative for chest pain and palpitations  Gastrointestinal: Negative for abdominal pain, diarrhea, nausea and vomiting           Current Medications       Current Outpatient Medications:     tobramycin (TOBREX) 0 3 % SOLN, Administer 1 drop to both eyes every 4 (four) hours while awake, Disp: 1 Bottle, Rfl: 0    benzonatate (TESSALON) 200 MG capsule, Take 1 capsule (200 mg total) by mouth 3 (three) times a day as needed for cough (Patient not taking: Reported on 4/4/2019), Disp: 20 capsule, Rfl: 0    naproxen (NAPROSYN) 500 mg tablet, Take 1 tablet (500 mg total) by mouth 2 (two) times a day with meals for 5 days, Disp: 10 tablet, Rfl: 0    neomycin-polymyxin-hydrocortisone (CORTISPORIN) otic solution, Administer 4 drops to the right ear every 6 (six) hours, Disp: 10 mL, Rfl: 0    terbinafine (LamISIL) 1 % cream, Apply topically 2 (two) times a day (Patient not taking: Reported on 12/24/2019), Disp: 42 g, Rfl: 1    tobramycin-dexamethasone (TOBRADEX) ophthalmic suspension, Administer 1 drop to the right eye every 4 (four) hours while awake, Disp: 5 mL, Rfl: 0    triamcinolone (KENALOG) 0 5 % cream, Apply topically 3 (three) times a day (Patient not taking: Reported on 12/24/2019), Disp: 30 g, Rfl: 3    Current Allergies     Allergies as of 12/24/2019    (No Known Allergies)            The following portions of the patient's history were reviewed and updated as appropriate: allergies, current medications, past family history, past medical history, past social history, past surgical history and problem list      Past Medical History:   Diagnosis Date    Hepatitis C virus infection cured after antiviral drug therapy        Past Surgical History:   Procedure Laterality Date    CYST REMOVAL      L chest        Family History   Problem Relation Age of Onset    Hypertension Mother          Medications have been verified  Objective   /76 (BP Location: Left arm, Patient Position: Sitting, Cuff Size: Extra-Large)   Pulse 92   Temp (!) 97 2 °F (36 2 °C) (Tympanic)   SpO2 97%        Physical Exam     Physical Exam   Constitutional: He appears well-developed and well-nourished  No distress  HENT:   Right Ear: Tympanic membrane, external ear and ear canal normal  Tympanic membrane is not injected  Left Ear: Tympanic membrane, external ear and ear canal normal  Tympanic membrane is not injected     Nose: Nose normal    Mouth/Throat: Uvula is midline, oropharynx is clear and moist and mucous membranes are normal    Eyes: Pupils are equal, round, and reactive to light  Conjunctivae are normal    R eye red   Neck: Normal range of motion  Neck supple  No thyromegaly present  Cardiovascular: Normal rate, regular rhythm and normal heart sounds  No murmur heard  Pulmonary/Chest: Effort normal and breath sounds normal  No respiratory distress  He has no wheezes  Lymphadenopathy:     He has no cervical adenopathy  Skin: He is not diaphoretic  BMI Counseling: There is no height or weight on file to calculate BMI  The BMI is above normal  Nutrition recommendations include reducing portion sizes

## 2020-03-17 ENCOUNTER — OFFICE VISIT (OUTPATIENT)
Dept: FAMILY MEDICINE CLINIC | Facility: CLINIC | Age: 30
End: 2020-03-17
Payer: COMMERCIAL

## 2020-03-17 VITALS
SYSTOLIC BLOOD PRESSURE: 130 MMHG | OXYGEN SATURATION: 98 % | BODY MASS INDEX: 25.91 KG/M2 | HEIGHT: 75 IN | HEART RATE: 83 BPM | WEIGHT: 208.4 LBS | DIASTOLIC BLOOD PRESSURE: 74 MMHG

## 2020-03-17 DIAGNOSIS — Z77.21 EXPOSURE TO BLOOD OR BODY FLUID: ICD-10-CM

## 2020-03-17 DIAGNOSIS — Z13.9 SCREENING FOR UNSPECIFIED CONDITION: ICD-10-CM

## 2020-03-17 DIAGNOSIS — Z00.00 WELLNESS EXAMINATION: Primary | ICD-10-CM

## 2020-03-17 DIAGNOSIS — Z11.4 SCREENING FOR HUMAN IMMUNODEFICIENCY VIRUS: ICD-10-CM

## 2020-03-17 DIAGNOSIS — N30.00 ACUTE CYSTITIS WITHOUT HEMATURIA: ICD-10-CM

## 2020-03-17 DIAGNOSIS — Z11.4 SCREENING FOR HUMAN IMMUNODEFICIENCY VIRUS: Primary | ICD-10-CM

## 2020-03-17 PROCEDURE — 99395 PREV VISIT EST AGE 18-39: CPT | Performed by: FAMILY MEDICINE

## 2020-03-17 RX ORDER — DOXYCYCLINE 100 MG/1
100 TABLET ORAL 2 TIMES DAILY
Qty: 20 TABLET | Refills: 0 | Status: SHIPPED | OUTPATIENT
Start: 2020-03-17 | End: 2020-03-27

## 2020-03-17 NOTE — PROGRESS NOTES
HPI:  Essie Noland is a 30 y o  male here for his yearly health maintenance exam    Patient Active Problem List   Diagnosis   (none) - all problems resolved or deleted     Past Medical History:   Diagnosis Date    Hepatitis C virus infection cured after antiviral drug therapy        1  Advanced Directive: n     2  Durable Power of  for Healthcare: n     3  Social History:           Drug and alcohol History: n                  4  Immunizations up to date: y                 Lifestyle:                           Healthy Diet:y                          Alcohol Use:y                          Tobacco Use:n                          Regular exercise:y                          Weight concerns:n                               5  Over the past 2 weeks, how often have you been bothered by the following:              Little interest or pleasure in doing things:n              Felling down, depressed or hopeless:n       No current outpatient medications on file  No current facility-administered medications for this visit  No Known Allergies    There is no immunization history on file for this patient  Patient Care Team:  Yolie Atwood MD as PCP - Gladis Stock MD as PCP - [address] [place] [address] (RTE)    Review of Systems   Constitutional: Negative for fatigue, fever and unexpected weight change  HENT: Negative for congestion, sinus pain and sore throat  Eyes: Negative for visual disturbance  Respiratory: Negative for shortness of breath and wheezing  Cardiovascular: Negative for chest pain and palpitations  Gastrointestinal: Negative for abdominal pain, nausea and vomiting  Musculoskeletal: Negative  Negative for arthralgias and myalgias  Neurological: Negative for syncope, weakness and numbness  Psychiatric/Behavioral: Negative  Negative for confusion, dysphoric mood and suicidal ideas         Physical Exam :  Physical Exam   Constitutional: He is oriented to person, place, and time  Vital signs are normal  He appears well-developed and well-nourished  HENT:   Right Ear: Ear canal normal  Tympanic membrane is not injected  Left Ear: Ear canal normal  Tympanic membrane is not injected  Nose: Nose normal    Mouth/Throat: Oropharynx is clear and moist    Eyes: Pupils are equal, round, and reactive to light  Conjunctivae and EOM are normal  Right eye exhibits no discharge  Left eye exhibits no discharge  Neck: Normal range of motion  Neck supple  No thyromegaly present  Cardiovascular: Normal rate, regular rhythm and normal heart sounds  No murmur heard  Pulmonary/Chest: Effort normal and breath sounds normal  No respiratory distress  He has no wheezes  Abdominal: Soft  Bowel sounds are normal  He exhibits no distension  There is no tenderness  Musculoskeletal: Normal range of motion  Lymphadenopathy:     He has no cervical adenopathy  Neurological: He is alert and oriented to person, place, and time  He has normal strength and normal reflexes  He is not disoriented  No sensory deficit  Gait normal    Skin: Skin is warm and dry  Psychiatric: He has a normal mood and affect  His speech is normal and behavior is normal  Judgment and thought content normal  Cognition and memory are normal    BMI Counseling: Body mass index is 26 05 kg/m²  The BMI is above normal  Nutrition recommendations include reducing portion sizes  Assessment and Plan:  1   Wellness examination         Health Maintenance Due   Topic Date Due    Annual Physical  05/03/2006

## 2020-03-18 ENCOUNTER — TELEPHONE (OUTPATIENT)
Dept: FAMILY MEDICINE CLINIC | Facility: CLINIC | Age: 30
End: 2020-03-18

## 2020-03-18 LAB
ALBUMIN SERPL-MCNC: 4.8 G/DL (ref 3.6–5.1)
ALBUMIN/GLOB SERPL: 1.7 (CALC) (ref 1–2.5)
ALP SERPL-CCNC: 84 U/L (ref 36–130)
ALT SERPL-CCNC: 17 U/L (ref 9–46)
AST SERPL-CCNC: 19 U/L (ref 10–40)
BILIRUB SERPL-MCNC: 1.5 MG/DL (ref 0.2–1.2)
BUN SERPL-MCNC: 15 MG/DL (ref 7–25)
BUN/CREAT SERPL: ABNORMAL (CALC) (ref 6–22)
C TRACH RRNA SPEC QL NAA+PROBE: NOT DETECTED
CALCIUM SERPL-MCNC: 9.8 MG/DL (ref 8.6–10.3)
CHLORIDE SERPL-SCNC: 102 MMOL/L (ref 98–110)
CHOLEST SERPL-MCNC: 167 MG/DL
CHOLEST/HDLC SERPL: 2.7 (CALC)
CO2 SERPL-SCNC: 27 MMOL/L (ref 20–32)
CREAT SERPL-MCNC: 1.1 MG/DL (ref 0.6–1.35)
GLOBULIN SER CALC-MCNC: 2.8 G/DL (CALC) (ref 1.9–3.7)
GLUCOSE SERPL-MCNC: 84 MG/DL (ref 65–99)
HDLC SERPL-MCNC: 61 MG/DL
HIV 1+2 AB+HIV1 P24 AG SERPL QL IA: NORMAL
LDLC SERPL CALC-MCNC: 90 MG/DL (CALC)
N GONORRHOEA RRNA SPEC QL NAA+PROBE: NOT DETECTED
NONHDLC SERPL-MCNC: 106 MG/DL (CALC)
POTASSIUM SERPL-SCNC: 4 MMOL/L (ref 3.5–5.3)
PROT SERPL-MCNC: 7.6 G/DL (ref 6.1–8.1)
SL AMB EGFR AFRICAN AMERICAN: 103 ML/MIN/1.73M2
SL AMB EGFR NON AFRICAN AMERICAN: 89 ML/MIN/1.73M2
SODIUM SERPL-SCNC: 139 MMOL/L (ref 135–146)
TESTOST SERPL-MCNC: 517 NG/DL (ref 250–827)
TRIGL SERPL-MCNC: 73 MG/DL

## 2020-03-18 NOTE — TELEPHONE ENCOUNTER
----- Message from Eli Apley, MD sent at 3/18/2020  1:39 PM EDT -----  MM/LABS 12MOS---labs good---all NL

## 2020-07-10 ENCOUNTER — TELEPHONE (OUTPATIENT)
Dept: FAMILY MEDICINE CLINIC | Facility: CLINIC | Age: 30
End: 2020-07-10

## 2020-07-10 DIAGNOSIS — B36.0 TINEA VERSICOLOR: Primary | ICD-10-CM

## 2020-07-10 RX ORDER — PRENATAL VIT 91/IRON/FOLIC/DHA 28-975-200
COMBINATION PACKAGE (EA) ORAL 2 TIMES DAILY
Qty: 30 G | Refills: 0 | Status: SHIPPED | OUTPATIENT
Start: 2020-07-10 | End: 2021-11-13

## 2020-07-10 RX ORDER — TERBINAFINE HYDROCHLORIDE 250 MG/1
250 TABLET ORAL DAILY
Qty: 10 TABLET | Refills: 0 | Status: SHIPPED | OUTPATIENT
Start: 2020-07-10 | End: 2020-07-20

## 2021-03-08 ENCOUNTER — TELEPHONE (OUTPATIENT)
Dept: OBGYN CLINIC | Facility: HOSPITAL | Age: 31
End: 2021-03-08

## 2021-03-08 ENCOUNTER — TELEPHONE (OUTPATIENT)
Dept: FAMILY MEDICINE CLINIC | Facility: CLINIC | Age: 31
End: 2021-03-08

## 2021-03-08 DIAGNOSIS — R52 PAIN: Primary | ICD-10-CM

## 2021-03-08 RX ORDER — OXYCODONE HYDROCHLORIDE AND ACETAMINOPHEN 5; 325 MG/1; MG/1
1 TABLET ORAL EVERY 4 HOURS PRN
Qty: 30 TABLET | Refills: 0 | Status: SHIPPED | OUTPATIENT
Start: 2021-03-08 | End: 2021-11-13
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
## 2021-03-08 NOTE — TELEPHONE ENCOUNTER
Spoke with PT who supplied [facility]  [place] at [phone]  He has call in to verify [facility] participates  His private jessica insurance is purchased by patient   He was reminded to call the insur number on back of card to verify participation

## 2021-03-08 NOTE — TELEPHONE ENCOUNTER
email to admin    Good Morning,    I was contacted by patient Aram Romero ([phone]) who made a new patient appt on 03 11 with Dr Esther Bower  Patient advised this appt is going through [place] comp of [place] ([employer])  Patient also states he would like to put a secondary insurance on file incase work comp does not cover charges  Patient would like to confirm we are in network with [employer]  Patient is unsure if this is a multi plan or not    Can you advise if his secondary will be in network?  Patient CB# [phone]    [address],  shanelle, CB# [phone], FAX# [phone] [address],  Vikash Del Valle

## 2021-09-29 ENCOUNTER — TELEPHONE (OUTPATIENT)
Dept: FAMILY MEDICINE CLINIC | Facility: CLINIC | Age: 31
End: 2021-09-29

## 2021-09-29 DIAGNOSIS — L23.7 POISON IVY: Primary | ICD-10-CM

## 2021-09-29 RX ORDER — PREDNISONE 20 MG/1
TABLET ORAL
Qty: 15 TABLET | Refills: 0 | Status: SHIPPED | OUTPATIENT
Start: 2021-09-29 | End: 2021-11-13

## 2021-11-13 ENCOUNTER — OFFICE VISIT (OUTPATIENT)
Dept: FAMILY MEDICINE CLINIC | Facility: CLINIC | Age: 31
End: 2021-11-13
Payer: COMMERCIAL

## 2021-11-13 VITALS
TEMPERATURE: 98.2 F | HEART RATE: 80 BPM | HEIGHT: 75 IN | SYSTOLIC BLOOD PRESSURE: 122 MMHG | WEIGHT: 221 LBS | DIASTOLIC BLOOD PRESSURE: 78 MMHG | BODY MASS INDEX: 27.48 KG/M2

## 2021-11-13 DIAGNOSIS — Z13.6 SCREENING FOR CARDIOVASCULAR CONDITION: ICD-10-CM

## 2021-11-13 DIAGNOSIS — N64.4 NIPPLE SORENESS: ICD-10-CM

## 2021-11-13 DIAGNOSIS — B36.0 TINEA VERSICOLOR: ICD-10-CM

## 2021-11-13 DIAGNOSIS — R53.83 FATIGUE, UNSPECIFIED TYPE: ICD-10-CM

## 2021-11-13 DIAGNOSIS — Z00.00 ANNUAL PHYSICAL EXAM: Primary | ICD-10-CM

## 2021-11-13 DIAGNOSIS — H00.011 HORDEOLUM EXTERNUM OF RIGHT UPPER EYELID: ICD-10-CM

## 2021-11-13 PROCEDURE — 99395 PREV VISIT EST AGE 18-39: CPT | Performed by: FAMILY MEDICINE

## 2021-11-13 PROCEDURE — 3725F SCREEN DEPRESSION PERFORMED: CPT | Performed by: FAMILY MEDICINE

## 2021-11-13 PROCEDURE — 3008F BODY MASS INDEX DOCD: CPT | Performed by: FAMILY MEDICINE

## 2021-11-13 PROCEDURE — 99214 OFFICE O/P EST MOD 30 MIN: CPT | Performed by: FAMILY MEDICINE

## 2021-11-13 PROCEDURE — 1036F TOBACCO NON-USER: CPT | Performed by: FAMILY MEDICINE

## 2021-11-13 RX ORDER — KETOCONAZOLE 20 MG/G
CREAM TOPICAL DAILY
Qty: 60 G | Refills: 1 | Status: SHIPPED | OUTPATIENT
Start: 2021-11-13 | End: 2022-03-02

## 2021-11-13 RX ORDER — KETOCONAZOLE 200 MG/1
200 TABLET ORAL DAILY
Qty: 16 TABLET | Refills: 0 | Status: SHIPPED | OUTPATIENT
Start: 2021-11-13 | End: 2021-11-29

## 2021-11-13 RX ORDER — ERYTHROMYCIN 5 MG/G
0.5 OINTMENT OPHTHALMIC 3 TIMES DAILY
Qty: 3.5 G | Refills: 0 | Status: SHIPPED | OUTPATIENT
Start: 2021-11-13 | End: 2022-03-02

## 2021-12-09 ENCOUNTER — TELEPHONE (OUTPATIENT)
Dept: FAMILY MEDICINE CLINIC | Facility: CLINIC | Age: 31
End: 2021-12-09

## 2022-03-02 ENCOUNTER — OFFICE VISIT (OUTPATIENT)
Dept: FAMILY MEDICINE CLINIC | Facility: CLINIC | Age: 32
End: 2022-03-02
Payer: COMMERCIAL

## 2022-03-02 VITALS
OXYGEN SATURATION: 98 % | HEART RATE: 68 BPM | TEMPERATURE: 98.3 F | SYSTOLIC BLOOD PRESSURE: 120 MMHG | DIASTOLIC BLOOD PRESSURE: 78 MMHG

## 2022-03-02 DIAGNOSIS — R35.0 URINARY FREQUENCY: Primary | ICD-10-CM

## 2022-03-02 DIAGNOSIS — R30.0 DYSURIA: ICD-10-CM

## 2022-03-02 LAB
SL AMB  POCT GLUCOSE, UA: NEGATIVE
SL AMB LEUKOCYTE ESTERASE,UA: NEGATIVE
SL AMB POCT BILIRUBIN,UA: NEGATIVE
SL AMB POCT BLOOD,UA: NEGATIVE
SL AMB POCT CLARITY,UA: CLEAR
SL AMB POCT COLOR,UA: YELLOW
SL AMB POCT KETONES,UA: NEGATIVE
SL AMB POCT NITRITE,UA: NEGATIVE
SL AMB POCT PH,UA: 7.5
SL AMB POCT SPECIFIC GRAVITY,UA: 1.02
SL AMB POCT URINE PROTEIN: NEGATIVE
SL AMB POCT UROBILINOGEN: 0.2

## 2022-03-02 PROCEDURE — 99213 OFFICE O/P EST LOW 20 MIN: CPT | Performed by: FAMILY MEDICINE

## 2022-03-02 PROCEDURE — 81002 URINALYSIS NONAUTO W/O SCOPE: CPT | Performed by: FAMILY MEDICINE

## 2022-03-02 RX ORDER — DOXYCYCLINE HYCLATE 100 MG/1
100 CAPSULE ORAL EVERY 12 HOURS SCHEDULED
Qty: 20 CAPSULE | Refills: 0 | Status: SHIPPED | OUTPATIENT
Start: 2022-03-02 | End: 2022-03-12

## 2022-03-02 NOTE — PROGRESS NOTES
[id number]         NAME: Ashwin Ortega is a 33 y o  male  : 1990    MRN: [id number]  DATE: March 3, 2022  TIME: 7:44 AM    Assessment and Plan   Urinary frequency [R35 0]  1  Urinary frequency  POCT urine dip    doxycycline hyclate (VIBRAMYCIN) 100 mg capsule    Urine culture    Chlamydia/N  gonorrhoeae RNA, TMA    CANCELED: Urine culture    CANCELED: Chlamydia/N  gonorrhoeae RNA, TMA   2  Dysuria  doxycycline hyclate (VIBRAMYCIN) 100 mg capsule    Urine culture    Chlamydia/N  gonorrhoeae RNA, TMA    CANCELED: Urine culture    CANCELED: Chlamydia/N  gonorrhoeae RNA, TMA       No problem-specific Assessment & Plan notes found for this encounter  Patient Instructions     Patient Instructions   Send urine for culture and chlamydia testing  Start doxycycline pending results  Chief Complaint     Chief Complaint   Patient presents with    Urinary Frequency     burning at end, pain x1week         History of Present Illness       Patient comes in today with urinary tract symptoms  Notes some mild dysuria  No blood noted no discharge noted  Does have a new sexual partner at had unprotected relations  No penile discharge noted  Review of Systems   Review of Systems   Constitutional: Negative for fever  Respiratory: Negative for cough, chest tightness and shortness of breath  Cardiovascular: Negative for chest pain, palpitations and leg swelling  Gastrointestinal: Negative for abdominal pain, blood in stool, diarrhea and nausea  Genitourinary: Positive for dysuria  Negative for difficulty urinating, flank pain, frequency, hematuria, penile discharge, penile pain, scrotal swelling, testicular pain and urgency  Musculoskeletal: Negative for back pain           Current Medications       Current Outpatient Medications:     doxycycline hyclate (VIBRAMYCIN) 100 mg capsule, Take 1 capsule (100 mg total) by mouth every 12 (twelve) hours for 10 days, Disp: 20 capsule, Rfl: 0    Current Allergies     Allergies as of 03/02/2022    (No Known Allergies)            The following portions of the patient's history were reviewed and updated as appropriate: allergies, current medications, past family history, past medical history, past social history, past surgical history and problem list      Past Medical History:   Diagnosis Date    Hepatitis C virus infection cured after antiviral drug therapy     Hordeolum externum of right upper eyelid        Past Surgical History:   Procedure Laterality Date    CYST REMOVAL      L chest        Family History   Problem Relation Age of Onset    Hypertension Mother          Medications have been verified  Objective   /78 (BP Location: Left arm, Patient Position: Sitting, Cuff Size: Large)   Pulse 68   Temp 98 3 °F (36 8 °C) (Oral)   SpO2 98%        Physical Exam     Physical Exam  Constitutional:       General: He is not in acute distress  Appearance: He is well-developed  He is not ill-appearing  Eyes:      Conjunctiva/sclera: Conjunctivae normal       Pupils: Pupils are equal, round, and reactive to light  Neck:      Thyroid: No thyromegaly  Cardiovascular:      Rate and Rhythm: Normal rate and regular rhythm  Heart sounds: Normal heart sounds  No murmur heard  Pulmonary:      Effort: Pulmonary effort is normal  No respiratory distress  Breath sounds: Normal breath sounds  Abdominal:      General: Bowel sounds are normal  There is no distension  Palpations: Abdomen is soft  There is no splenomegaly or mass  Tenderness: There is no abdominal tenderness  There is no guarding  Genitourinary:     Penis: Normal        Testes: Normal    Musculoskeletal:      Cervical back: Normal range of motion and neck supple

## 2022-03-03 LAB
C TRACH RRNA SPEC QL NAA+PROBE: NOT DETECTED
N GONORRHOEA RRNA SPEC QL NAA+PROBE: NOT DETECTED

## 2022-03-04 ENCOUNTER — TELEPHONE (OUTPATIENT)
Dept: FAMILY MEDICINE CLINIC | Facility: CLINIC | Age: 32
End: 2022-03-04

## 2022-03-04 NOTE — TELEPHONE ENCOUNTER
----- Message from Delia Haro MD sent at 3/4/2022  9:17 AM EST -----  Call patient with lab result-urine culture is no growth  No chlamydia or gonorrhea detected  Get progress report on symptoms

## 2022-03-04 NOTE — RESULT ENCOUNTER NOTE
Call patient with lab result-urine culture is no growth  No chlamydia or gonorrhea detected  Get progress report on symptoms

## 2022-03-04 NOTE — TELEPHONE ENCOUNTER
Pt aware  Pt still have discomfort also, gonna finish the medication  He's asking what he should recommend next

## 2022-05-08 ENCOUNTER — APPOINTMENT (EMERGENCY)
Dept: RADIOLOGY | Facility: HOSPITAL | Age: 32
End: 2022-05-08

## 2022-05-08 ENCOUNTER — HOSPITAL ENCOUNTER (EMERGENCY)
Facility: HOSPITAL | Age: 32
Discharge: HOME | End: 2022-05-08
Attending: EMERGENCY MEDICINE

## 2022-05-08 VITALS
HEIGHT: 72 IN | BODY MASS INDEX: 30.48 KG/M2 | OXYGEN SATURATION: 98 % | SYSTOLIC BLOOD PRESSURE: 118 MMHG | RESPIRATION RATE: 17 BRPM | HEART RATE: 84 BPM | DIASTOLIC BLOOD PRESSURE: 74 MMHG | WEIGHT: 225 LBS | TEMPERATURE: 98.6 F

## 2022-05-08 DIAGNOSIS — S02.2XXA CLOSED FRACTURE OF NASAL BONE, INITIAL ENCOUNTER: ICD-10-CM

## 2022-05-08 DIAGNOSIS — S09.90XA INJURY OF HEAD, INITIAL ENCOUNTER: Primary | ICD-10-CM

## 2022-05-08 PROCEDURE — 70450 CT HEAD/BRAIN W/O DYE: CPT | Mod: ME

## 2022-05-08 PROCEDURE — 99284 EMERGENCY DEPT VISIT MOD MDM: CPT | Mod: 25

## 2022-05-08 PROCEDURE — G1004 CDSM NDSC: HCPCS

## 2022-05-08 PROCEDURE — 70486 CT MAXILLOFACIAL W/O DYE: CPT | Mod: ME

## 2022-05-08 PROCEDURE — 63700000 HC SELF-ADMINISTRABLE DRUG: Performed by: PHYSICIAN ASSISTANT

## 2022-05-08 PROCEDURE — 73130 X-RAY EXAM OF HAND: CPT | Mod: LT

## 2022-05-08 PROCEDURE — 73080 X-RAY EXAM OF ELBOW: CPT | Mod: LT

## 2022-05-08 RX ORDER — ACETAMINOPHEN 325 MG/1
650 TABLET ORAL ONCE
Status: COMPLETED | OUTPATIENT
Start: 2022-05-08 | End: 2022-05-08

## 2022-05-08 RX ADMIN — ACETAMINOPHEN 650 MG: 325 TABLET ORAL at 02:53
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
## 2022-05-08 NOTE — DISCHARGE INSTRUCTIONS
You have a nasal bone fracture. Thi swill heal on its own. You can follow up with ENT for further evaluation     Please use ice to your nose and head.  Please do not apply directly to the skin.  Please do not leave in place while asleep.  Please place for 10-15 minutes and then remove it for 10-15 minutes    You can take 600 mg motrin or 650 mg tylenol very 6 hours for pain      You have been diagnosed with a minor brain injury.  Work-up for signs for concussion which include symptoms like brain fog, nausea, mild headache, etc    Brain rest is the main treatment for concussion.  This includes limiting screen time (phones, TVs, computers, etc.) and physical rest.     Follow-up with concussion clinic for further evaluation  Follow-up with your primary care doctor for further evaluation    It is important that you seek re-evaluation by a healthcare provider if you experience any of the following:  Changes in mental state or alertness  Throwing up again and again  Very bad headache that starts suddenly or rapidly worsens  Signs of a stroke such as trouble seeing, severe dizziness, weakness or numbness of your face, arm, or leg, especially if only on one side of your body.

## 2022-05-09 ENCOUNTER — OFFICE VISIT (OUTPATIENT)
Dept: FAMILY MEDICINE CLINIC | Facility: CLINIC | Age: 32
End: 2022-05-09
Payer: COMMERCIAL

## 2022-05-09 ENCOUNTER — TELEPHONE (OUTPATIENT)
Dept: FAMILY MEDICINE CLINIC | Facility: CLINIC | Age: 32
End: 2022-05-09

## 2022-05-09 VITALS
WEIGHT: 216 LBS | HEIGHT: 75 IN | HEART RATE: 88 BPM | BODY MASS INDEX: 26.86 KG/M2 | SYSTOLIC BLOOD PRESSURE: 132 MMHG | RESPIRATION RATE: 16 BRPM | DIASTOLIC BLOOD PRESSURE: 78 MMHG | OXYGEN SATURATION: 97 %

## 2022-05-09 DIAGNOSIS — S02.2XXA CLOSED FRACTURE OF NASAL BONE, INITIAL ENCOUNTER: Primary | ICD-10-CM

## 2022-05-09 PROCEDURE — 99214 OFFICE O/P EST MOD 30 MIN: CPT | Performed by: FAMILY MEDICINE

## 2022-05-09 RX ORDER — TRAMADOL HYDROCHLORIDE 50 MG/1
50 TABLET ORAL EVERY 6 HOURS PRN
Qty: 30 TABLET | Refills: 1 | Status: SHIPPED | OUTPATIENT
Start: 2022-05-09

## 2022-05-09 NOTE — PROGRESS NOTES
Assessment/Plan:    No problem-specific Assessment & Plan notes found for this encounter  There are no diagnoses linked to this encounter  Subjective:   Chief Complaint   Patient presents with    Follow-up     [facility]- nose fx/post concussion        Patient ID: Pedro Puente is a 27 y o  male  F/u facial trauma      The following portions of the patient's history were reviewed and updated as appropriate: allergies, current medications, past family history, past medical history, past social history, past surgical history and problem list     Review of Systems   Constitutional: Negative for fatigue, fever and unexpected weight change  HENT: Negative for congestion, sinus pain and sore throat  Eyes: Negative for visual disturbance  Respiratory: Negative for shortness of breath and wheezing  Cardiovascular: Negative for chest pain and palpitations  Gastrointestinal: Negative for abdominal pain, nausea and vomiting  Musculoskeletal: Negative  Negative for arthralgias and myalgias  Neurological: Negative for syncope, weakness and numbness  Psychiatric/Behavioral: Negative  Negative for confusion, dysphoric mood and suicidal ideas  Objective:  Vitals:    05/09/22 1456   BP: 132/78   Pulse: 88   Resp: 16   SpO2: 97%   Weight: 98 kg (216 lb)   Height: 6' 2 5" (1 892 m)      Physical Exam  Constitutional:       Appearance: He is well-developed  HENT:      Right Ear: Ear canal normal  Tympanic membrane is not injected  Left Ear: Ear canal normal  Tympanic membrane is not injected  Nose: Nose normal    Eyes:      General:         Right eye: No discharge  Left eye: No discharge  Conjunctiva/sclera: Conjunctivae normal       Pupils: Pupils are equal, round, and reactive to light  Neck:      Thyroid: No thyromegaly  Cardiovascular:      Rate and Rhythm: Normal rate and regular rhythm  Heart sounds: Normal heart sounds  No murmur heard        Pulmonary: Effort: Pulmonary effort is normal  No respiratory distress  Breath sounds: Normal breath sounds  No wheezing  Abdominal:      General: Bowel sounds are normal  There is no distension  Palpations: Abdomen is soft  Tenderness: There is no abdominal tenderness  Musculoskeletal:         General: Normal range of motion  Cervical back: Normal range of motion and neck supple  Lymphadenopathy:      Cervical: No cervical adenopathy  Skin:     General: Skin is warm and dry  Neurological:      Mental Status: He is alert and oriented to person, place, and time  He is not disoriented  Sensory: No sensory deficit  Gait: Gait normal       Deep Tendon Reflexes: Reflexes are normal and symmetric  Psychiatric:         Speech: Speech normal          Behavior: Behavior normal          Thought Content:  Thought content normal          Judgment: Judgment normal        sl nasal devaition--nasal Fx

## 2022-05-09 NOTE — TELEPHONE ENCOUNTER
Prior Auth needed for tramadol   Patient advised Good Rx coupon and this can take up to 2-3 days   still requesting prior Auth      Patient advise this will not be completed today

## 2022-05-10 ASSESSMENT — ENCOUNTER SYMPTOMS
DIARRHEA: 0
WEAKNESS: 0
NUMBNESS: 0
NAUSEA: 0
COLOR CHANGE: 0
COUGH: 0
SHORTNESS OF BREATH: 0
VOMITING: 0
NECK PAIN: 1
FEVER: 0
ABDOMINAL PAIN: 0
WOUND: 1
HEADACHES: 1

## 2022-05-16 ENCOUNTER — TELEPHONE (OUTPATIENT)
Dept: FAMILY MEDICINE CLINIC | Facility: CLINIC | Age: 32
End: 2022-05-16

## 2022-05-16 DIAGNOSIS — S02.2XXA CLOSED FRACTURE OF NASAL BONE, INITIAL ENCOUNTER: Primary | ICD-10-CM

## 2022-05-16 DIAGNOSIS — R52 PAIN: Primary | ICD-10-CM

## 2022-05-16 RX ORDER — OXYCODONE HYDROCHLORIDE AND ACETAMINOPHEN 5; 325 MG/1; MG/1
1 TABLET ORAL EVERY 4 HOURS PRN
Qty: 30 TABLET | Refills: 0 | Status: SHIPPED | OUTPATIENT
Start: 2022-05-16

## 2022-05-16 RX ORDER — OXYCODONE HYDROCHLORIDE AND ACETAMINOPHEN 5; 325 MG/1; MG/1
1 TABLET ORAL EVERY 4 HOURS PRN
Qty: 30 TABLET | Refills: 0 | Status: CANCELLED | OUTPATIENT
Start: 2022-05-16

## 2022-05-16 NOTE — TELEPHONE ENCOUNTER
Still in a lot of pain  Waiting for nose surgery  Asking for a small amount of percocet to help him get through until surgery complete      [facility]

## 2022-12-06 ENCOUNTER — OFFICE VISIT (OUTPATIENT)
Dept: FAMILY MEDICINE CLINIC | Facility: CLINIC | Age: 32
End: 2022-12-06

## 2022-12-06 VITALS
HEIGHT: 75 IN | BODY MASS INDEX: 25.94 KG/M2 | WEIGHT: 208.6 LBS | OXYGEN SATURATION: 99 % | HEART RATE: 82 BPM | TEMPERATURE: 97.5 F | SYSTOLIC BLOOD PRESSURE: 135 MMHG | DIASTOLIC BLOOD PRESSURE: 80 MMHG

## 2022-12-06 DIAGNOSIS — R52 GENERALIZED BODY ACHES: ICD-10-CM

## 2022-12-06 DIAGNOSIS — J02.9 ACUTE PHARYNGITIS, UNSPECIFIED ETIOLOGY: Primary | ICD-10-CM

## 2022-12-06 LAB — S PYO AG THROAT QL: NEGATIVE

## 2022-12-06 RX ORDER — IBUPROFEN 600 MG/1
600 TABLET ORAL EVERY 6 HOURS PRN
Qty: 30 TABLET | Refills: 0 | Status: SHIPPED | OUTPATIENT
Start: 2022-12-06 | End: 2022-12-07 | Stop reason: SDUPTHER

## 2022-12-06 NOTE — PATIENT INSTRUCTIONS
Rapid strep test was negative  Discussed viral vs bacterial infection  Gargle with salt water  Continue Tylenol/Ibuprofen as directed for body aches/fever  Call or return for problems/concerns

## 2022-12-06 NOTE — PROGRESS NOTES
[address] [facility]        NAME: Carolina Ramirez is a 27 y o  male  : 1990    MRN: [id number]  DATE: 2022  TIME: 3:03 PM    Assessment and Plan   Acute pharyngitis, unspecified etiology [J02 9]  1  Acute pharyngitis, unspecified etiology        2  Generalized body aches  ibuprofen (MOTRIN) 600 mg tablet            Patient Instructions     Patient Instructions   Rapid strep test was negative  Discussed viral vs bacterial infection  Gargle with salt water  Continue Tylenol/Ibuprofen as directed for body aches/fever  Call or return for problems/concerns          Chief Complaint     Chief Complaint   Patient presents with   • Sore Throat      chills and fever  Lost of appetite, no energy  Aches and pain, congested  Headaches, ear pain         History of Present Illness       C/o sore throat, congestion, body aches and fever started last night  Taking tylenol and ibuprofen  Review of Systems   Review of Systems   Constitutional: Positive for fever  Negative for activity change, chills and fatigue  HENT: Positive for congestion, postnasal drip and sore throat  Negative for ear pain, rhinorrhea and sinus pressure  Eyes: Negative for pain, discharge and redness  Respiratory: Negative for cough and wheezing  Cardiovascular: Negative for chest pain  Gastrointestinal: Negative for constipation, diarrhea, nausea and vomiting  Musculoskeletal: Positive for myalgias  Skin: Negative for rash  Neurological: Negative for dizziness and headaches  Psychiatric/Behavioral: Negative for dysphoric mood           Current Medications       Current Outpatient Medications:   •  ibuprofen (MOTRIN) 600 mg tablet, Take 1 tablet (600 mg total) by mouth every 6 (six) hours as needed for mild pain or fever, Disp: 30 tablet, Rfl: 0  •  oxyCODONE-acetaminophen (PERCOCET) 5-325 mg per tablet, Take 1 tablet by mouth every 4 (four) hours as needed for severe pain Max Daily Amount: 6 tablets (Patient not taking: Reported on 12/6/2022), Disp: 30 tablet, Rfl: 0  •  traMADol (Ultram) 50 mg tablet, Take 1 tablet (50 mg total) by mouth every 6 (six) hours as needed for moderate pain (Patient not taking: Reported on 12/6/2022), Disp: 30 tablet, Rfl: 1    Current Allergies     Allergies as of 12/06/2022   • (No Known Allergies)            The following portions of the patient's history were reviewed and updated as appropriate: allergies, current medications, past family history, past medical history, past social history, past surgical history and problem list      Past Medical History:   Diagnosis Date   • Hepatitis C virus infection cured after antiviral drug therapy    • Hordeolum externum of right upper eyelid    • Nasal congestion        Past Surgical History:   Procedure Laterality Date   • CYST REMOVAL      L chest        Family History   Problem Relation Age of Onset   • Hypertension Mother          Medications have been verified  Objective   /80 (BP Location: Left arm, Patient Position: Sitting, Cuff Size: Standard)   Pulse 82   Temp 97 5 °F (36 4 °C) (Tympanic)   Ht 6' 2 5" (1 892 m)   Wt 94 6 kg (208 lb 9 6 oz)   SpO2 99%   BMI 26 42 kg/m²        Physical Exam     Physical Exam  Vitals reviewed  Constitutional:       General: He is not in acute distress  Appearance: He is well-developed  He is not ill-appearing  HENT:      Head: Normocephalic  Right Ear: Tympanic membrane and ear canal normal  No drainage, swelling or tenderness  No middle ear effusion  Tympanic membrane is not erythematous  Left Ear: Tympanic membrane and ear canal normal  No drainage, swelling or tenderness  No middle ear effusion  Tympanic membrane is not erythematous  Nose: Congestion and rhinorrhea present  Mouth/Throat:      Mouth: Mucous membranes are moist  No oral lesions  Pharynx: Pharyngeal swelling and posterior oropharyngeal erythema present   No oropharyngeal exudate or uvula swelling  Tonsils: No tonsillar exudate or tonsillar abscesses  2+ on the right  1+ on the left  Eyes:      Extraocular Movements:      Right eye: Normal extraocular motion  Left eye: Normal extraocular motion  Conjunctiva/sclera: Conjunctivae normal    Neck:      Thyroid: No thyromegaly  Cardiovascular:      Rate and Rhythm: Normal rate and regular rhythm  Heart sounds: Normal heart sounds  No murmur heard  No friction rub  No gallop  Pulmonary:      Effort: Pulmonary effort is normal  No respiratory distress  Breath sounds: Normal breath sounds  No wheezing or rhonchi  Musculoskeletal:      Cervical back: Neck supple  Lymphadenopathy:      Cervical: No cervical adenopathy  Skin:     General: Skin is warm and dry  Coloration: Skin is not pale  Findings: No erythema or rash  Neurological:      Mental Status: He is alert and oriented to person, place, and time  Psychiatric:         Mood and Affect: Mood normal          Behavior: Behavior normal          PHQ-2/9 Depression Screening    Little interest or pleasure in doing things: 0 - not at all  Feeling down, depressed, or hopeless: 0 - not at all  Trouble falling or staying asleep, or sleeping too much: 1 - several days  Feeling tired or having little energy: 1 - several days  Poor appetite or overeatin - several days  Feeling bad about yourself - or that you are a failure or have let yourself or your family down: 0 - not at all  Trouble concentrating on things, such as reading the newspaper or watching television: 0 - not at all  Moving or speaking so slowly that other people could have noticed   Or the opposite - being so fidgety or restless that you have been moving around a lot more than usual: 0 - not at all  Thoughts that you would be better off dead, or of hurting yourself in some way: 0 - not at all  PHQ-2 Score: 0  PHQ-2 Interpretation: Negative depression screen  PHQ-9 Score: 3   PHQ-9 Interpretation: No or Minimal depression

## 2022-12-07 ENCOUNTER — TELEPHONE (OUTPATIENT)
Dept: FAMILY MEDICINE CLINIC | Facility: CLINIC | Age: 32
End: 2022-12-07

## 2022-12-07 DIAGNOSIS — J02.9 ACUTE PHARYNGITIS, UNSPECIFIED ETIOLOGY: Primary | ICD-10-CM

## 2022-12-07 DIAGNOSIS — R52 GENERALIZED BODY ACHES: ICD-10-CM

## 2022-12-07 DIAGNOSIS — R05.1 ACUTE COUGH: ICD-10-CM

## 2022-12-07 RX ORDER — PREDNISONE 20 MG/1
TABLET ORAL
Qty: 15 TABLET | Refills: 0 | Status: SHIPPED | OUTPATIENT
Start: 2022-12-07 | End: 2022-12-17

## 2022-12-07 RX ORDER — IBUPROFEN 600 MG/1
600 TABLET ORAL EVERY 6 HOURS PRN
Qty: 30 TABLET | Refills: 0 | Status: SHIPPED | OUTPATIENT
Start: 2022-12-07

## 2023-11-13 ENCOUNTER — TELEPHONE (OUTPATIENT)
Dept: FAMILY MEDICINE CLINIC | Facility: CLINIC | Age: 33
End: 2023-11-13

## 2024-03-18 ENCOUNTER — OFFICE VISIT (OUTPATIENT)
Dept: FAMILY MEDICINE CLINIC | Facility: CLINIC | Age: 34
End: 2024-03-18
Payer: COMMERCIAL

## 2024-03-18 VITALS
DIASTOLIC BLOOD PRESSURE: 80 MMHG | HEIGHT: 75 IN | SYSTOLIC BLOOD PRESSURE: 128 MMHG | OXYGEN SATURATION: 99 % | BODY MASS INDEX: 30.34 KG/M2 | WEIGHT: 244 LBS

## 2024-03-18 DIAGNOSIS — R53.83 OTHER FATIGUE: ICD-10-CM

## 2024-03-18 DIAGNOSIS — E78.2 MIXED HYPERLIPIDEMIA: ICD-10-CM

## 2024-03-18 DIAGNOSIS — Z00.00 WELLNESS EXAMINATION: Primary | ICD-10-CM

## 2024-03-18 PROCEDURE — 99395 PREV VISIT EST AGE 18-39: CPT | Performed by: FAMILY MEDICINE

## 2024-03-18 NOTE — PROGRESS NOTES
HPI:  Mike Cole is a 33 y.o. male here for his yearly health maintenance exam.   Patient Active Problem List   Diagnosis    Tinea versicolor     Past Medical History:   Diagnosis Date    Hepatitis C virus infection cured after antiviral drug therapy     Hordeolum externum of right upper eyelid     Nasal congestion        1. Advanced Directive: n     2. Durable Power of  for Healthcare: n     3. Social History:           Drug and alcohol History: n                  4. Immunizations up to date: y                 Lifestyle:                           Healthy Diet:y                          Alcohol Use:y                          Tobacco Use:n                          Regular exercise:y                          Weight concerns:n                               5. Over the past 2 weeks, how often have you been bothered by the following:              Little interest or pleasure in doing things:n              Felling down, depressed or hopeless:n       Current Outpatient Medications   Medication Sig Dispense Refill    ibuprofen (MOTRIN) 600 mg tablet Take 1 tablet (600 mg total) by mouth every 6 (six) hours as needed for mild pain or fever (Patient not taking: Reported on 3/18/2024) 30 tablet 0     No current facility-administered medications for this visit.     No Known Allergies  Immunization History   Administered Date(s) Administered    COVID-19 J&J (Bryon) vaccine 0.5 mL 11/21/2021       Patient Care Team:  Kervin Magana MD as PCP - General  Kervin Magana MD as PCP - [facility]CP-[facility] ([facility])  Kervin Magana MD as PCP - PCP-[facility] ([facility])    Review of Systems   Constitutional:  Negative for fatigue, fever and unexpected weight change.   HENT:  Negative for congestion, sinus pain and sore throat.    Eyes:  Negative for visual disturbance.   Respiratory:  Negative for shortness of breath and wheezing.    Cardiovascular:  Negative for chest pain and palpitations.   Gastrointestinal:   Negative for abdominal pain, nausea and vomiting.   Musculoskeletal: Negative.  Negative for arthralgias and myalgias.   Neurological:  Negative for syncope, weakness and numbness.   Psychiatric/Behavioral: Negative.  Negative for confusion, dysphoric mood and suicidal ideas.        Physical Exam :  Physical Exam  Constitutional:       Appearance: He is well-developed.   HENT:      Right Ear: Ear canal normal. Tympanic membrane is not injected.      Left Ear: Ear canal normal. Tympanic membrane is not injected.      Nose: Nose normal.   Eyes:      General:         Right eye: No discharge.         Left eye: No discharge.      Conjunctiva/sclera: Conjunctivae normal.      Pupils: Pupils are equal, round, and reactive to light.   Neck:      Thyroid: No thyromegaly.   Cardiovascular:      Rate and Rhythm: Normal rate and regular rhythm.      Heart sounds: Normal heart sounds. No murmur heard.  Pulmonary:      Effort: Pulmonary effort is normal. No respiratory distress.      Breath sounds: Normal breath sounds. No wheezing.   Abdominal:      General: Bowel sounds are normal. There is no distension.      Palpations: Abdomen is soft.      Tenderness: There is no abdominal tenderness.   Musculoskeletal:         General: Normal range of motion.      Cervical back: Normal range of motion and neck supple.   Lymphadenopathy:      Cervical: No cervical adenopathy.   Skin:     General: Skin is warm and dry.   Neurological:      Mental Status: He is alert and oriented to person, place, and time. He is not disoriented.      Sensory: No sensory deficit.      Motor: No weakness.      Coordination: Coordination normal.      Gait: Gait normal.      Deep Tendon Reflexes: Reflexes are normal and symmetric.   Psychiatric:         Speech: Speech normal.         Behavior: Behavior normal.         Thought Content: Thought content normal.         Judgment: Judgment normal.           Assessment and Plan:  1. Wellness examination             Health Maintenance Due   Topic Date Due    DTaP,Tdap,and Td Vaccines (1 - Tdap) Never done    Influenza Vaccine (1) Never done    COVID-19 Vaccine (2 - 2023-24 season) 09/01/2023

## 2024-04-11 ENCOUNTER — TELEPHONE (OUTPATIENT)
Dept: FAMILY MEDICINE CLINIC | Facility: CLINIC | Age: 34
End: 2024-04-11

## 2024-04-11 DIAGNOSIS — J40 BRONCHITIS: Primary | ICD-10-CM

## 2024-04-11 RX ORDER — BENZONATATE 200 MG/1
200 CAPSULE ORAL 3 TIMES DAILY PRN
Qty: 30 CAPSULE | Refills: 5 | Status: SHIPPED | OUTPATIENT
Start: 2024-04-11

## 2024-04-11 RX ORDER — AZITHROMYCIN 250 MG/1
TABLET, FILM COATED ORAL
Qty: 6 TABLET | Refills: 0 | Status: SHIPPED | OUTPATIENT
Start: 2024-04-11 | End: 2024-04-15

## 2024-04-11 NOTE — TELEPHONE ENCOUNTER
Pt has a cough that has been going on for around 10 days. Wanted something prescribed.     Pt is also having trouble sleeping because of the cough as well.     [facility] [place] [phone]

## 2024-06-24 ENCOUNTER — TELEPHONE (OUTPATIENT)
Age: 34
End: 2024-06-24

## 2024-06-24 DIAGNOSIS — U07.1 COVID: Primary | ICD-10-CM

## 2024-06-24 RX ORDER — NIRMATRELVIR AND RITONAVIR 300-100 MG
3 KIT ORAL 2 TIMES DAILY
Qty: 30 TABLET | Refills: 0 | Status: SHIPPED | OUTPATIENT
Start: 2024-06-24 | End: 2024-06-29

## 2024-06-24 NOTE — TELEPHONE ENCOUNTER
Patient tested positive for Covid yesterday and would like to know if he can have a prescription [facility].

## 2024-06-24 NOTE — TELEPHONE ENCOUNTER
Pt called back really wants the prescription.  Call patient back or send to pharmacy Paxlovid sent to [facility]. .  I told him about doctor note -  strongly recommend no Rx-- risks out weigh benefits unless he has a high risk condition.  Pt said he still wants the prescription.

## 2024-11-27 ENCOUNTER — TELEMEDICINE (OUTPATIENT)
Dept: FAMILY MEDICINE CLINIC | Facility: CLINIC | Age: 34
End: 2024-11-27
Payer: COMMERCIAL

## 2024-11-27 DIAGNOSIS — J01.00 ACUTE NON-RECURRENT MAXILLARY SINUSITIS: Primary | ICD-10-CM

## 2024-11-27 DIAGNOSIS — R05.1 ACUTE COUGH: ICD-10-CM

## 2024-11-27 PROCEDURE — 99213 OFFICE O/P EST LOW 20 MIN: CPT | Performed by: NURSE PRACTITIONER

## 2024-11-27 RX ORDER — BENZONATATE 200 MG/1
200 CAPSULE ORAL 3 TIMES DAILY PRN
Qty: 30 CAPSULE | Refills: 5 | Status: SHIPPED | OUTPATIENT
Start: 2024-11-27

## 2024-11-27 NOTE — PROGRESS NOTES
Virtual Regular Visit  Name: Mike Cole      : 1990      MRN: [id number]  Encounter Provider: ELIN Huber  Encounter Date: 2024   Encounter department: [facility]      Verification of patient location:  Patient is located at Home in the following state in which I hold an active license [place] :  Assessment & Plan  Acute non-recurrent maxillary sinusitis  Discussed viral vs bacterial infection  RX as ordered  Continue OTC cough/cold medications as directed  Call or return for problems/concerns    Orders:    amoxicillin-clavulanate (AUGMENTIN) 875-125 mg per tablet; Take 1 tablet by mouth every 12 (twelve) hours for 10 days    benzonatate (TESSALON) 200 MG capsule; Take 1 capsule (200 mg total) by mouth 3 (three) times a day as needed for cough    Acute cough    Orders:    benzonatate (TESSALON) 200 MG capsule; Take 1 capsule (200 mg total) by mouth 3 (three) times a day as needed for cough        Encounter provider ELIN Huber    The patient was identified by name and date of birth. Mike Cole was informed that this is a telemedicine visit and that the visit is being conducted through the [employer] platform. He agrees to proceed..  My office door was closed. No one else was in the room.  He acknowledged consent and understanding of privacy and security of the video platform. The patient has agreed to participate and understands they can discontinue the visit at any time.    Patient is aware this is a billable service.     History of Present Illness     C/o cough, headache, sinus pressure, chills started Friday. Negative Covid test. Taking otc cold medications with no relief.      Review of Systems   Constitutional:  Negative for activity change, chills, fatigue and fever.   HENT:  Positive for congestion, postnasal drip, rhinorrhea, sinus pressure and sore throat. Negative for ear pain.    Eyes:  Negative for pain, discharge and redness.    Respiratory:  Positive for cough. Negative for wheezing.    Cardiovascular:  Negative for chest pain.   Gastrointestinal:  Negative for constipation, diarrhea, nausea and vomiting.   Musculoskeletal:  Negative for myalgias.   Skin:  Negative for rash.   Neurological:  Positive for headaches. Negative for dizziness.       Objective   There were no vitals taken for this visit.    Physical Exam  Constitutional:       General: He is not in acute distress.     Appearance: He is ill-appearing. He is not diaphoretic.   HENT:      Head: Normocephalic.      Mouth/Throat:      Pharynx: Posterior oropharyngeal erythema present.   Eyes:      Extraocular Movements: Extraocular movements intact.   Pulmonary:      Effort: Pulmonary effort is normal. No respiratory distress.      Comments: Cough present  Skin:     Coloration: Skin is not pale.      Findings: No erythema.   Neurological:      Mental Status: He is alert and oriented to person, place, and time.   Psychiatric:         Mood and Affect: Mood normal.         Behavior: Behavior normal.         Visit Time  Total Visit Duration: 10 min

## 2025-02-03 ENCOUNTER — OFFICE VISIT (OUTPATIENT)
Dept: FAMILY MEDICINE CLINIC | Facility: CLINIC | Age: 35
End: 2025-02-03
Payer: COMMERCIAL

## 2025-02-03 VITALS
HEART RATE: 66 BPM | DIASTOLIC BLOOD PRESSURE: 79 MMHG | WEIGHT: 224 LBS | SYSTOLIC BLOOD PRESSURE: 136 MMHG | BODY MASS INDEX: 28.38 KG/M2 | TEMPERATURE: 98.4 F | OXYGEN SATURATION: 97 %

## 2025-02-03 DIAGNOSIS — M54.50 PAIN IN LEFT LUMBAR REGION OF BACK: ICD-10-CM

## 2025-02-03 DIAGNOSIS — M77.8 RIGHT ELBOW TENDONITIS: Primary | ICD-10-CM

## 2025-02-03 PROCEDURE — 99213 OFFICE O/P EST LOW 20 MIN: CPT | Performed by: NURSE PRACTITIONER

## 2025-02-03 RX ORDER — PREDNISONE 20 MG/1
TABLET ORAL
Qty: 15 TABLET | Refills: 0 | Status: SHIPPED | OUTPATIENT
Start: 2025-02-03

## 2025-02-03 RX ORDER — CYCLOBENZAPRINE HCL 10 MG
10 TABLET ORAL
Qty: 30 TABLET | Refills: 0 | Status: SHIPPED | OUTPATIENT
Start: 2025-02-03

## 2025-02-03 NOTE — ASSESSMENT & PLAN NOTE
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 Took Ibuprofen with mild relief.  Instructed on RICE. Trial a course of prednisone. If no improvement will f/up with ortho.    Orders:    predniSONE 20 mg tablet; Take one tablet twice a day x 5 days, then take one tablet daily x 5 day    Ambulatory Referral to Orthopedic Surgery; Future

## 2025-02-03 NOTE — ASSESSMENT & PLAN NOTE
C/o lumbar spine pain x 1 year. Pain mostly left side. Does not radiate. Denies numbness/tingling, no loss bladder/bowel. He works in construction  moving and lifting continuously.  Take Prednisone as prescribed. Instructed on back stretches-exercise sheet provided to patient. If no improvement will refer to comprehensive spine program.    Orders:    predniSONE 20 mg tablet; Take one tablet twice a day x 5 days, then take one tablet daily x 5 day    cyclobenzaprine (FLEXERIL) 10 mg tablet; Take 1 tablet (10 mg total) by mouth daily at bedtime as needed for muscle spasms    XR spine lumbar minimum 4 views non injury; Future

## 2025-02-03 NOTE — PROGRESS NOTES
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 Took Ibuprofen with mild relief.  Instructed on RICE. Trial a course of prednisone. If no improvement will f/up with ortho.    Orders:    predniSONE 20 mg tablet; Take one tablet twice a day x 5 days, then take one tablet daily x 5 day    Ambulatory Referral to Orthopedic Surgery; Future    Pain in left lumbar region of back  C/o lumbar spine pain x 1 year. Pain mostly left side. Does not radiate. Denies numbness/tingling, no loss bladder/bowel. He works in construction  moving and lifting continuously.  Take Prednisone as prescribed. Instructed on back stretches-exercise sheet provided to patient. If no improvement will refer to comprehensive spine program.    Orders:    predniSONE 20 mg tablet; Take one tablet twice a day x 5 days, then take one tablet daily x 5 day    cyclobenzaprine (FLEXERIL) 10 mg tablet; Take 1 tablet (10 mg total) by mouth daily at bedtime as needed for muscle spasms    XR spine lumbar minimum 4 views non injury; Future           History of Present Illness   C/o right elbow pain x 2 months and low back pain x 1 year.      Review of Systems   Constitutional:  Negative for activity change.   Respiratory:  Negative for chest tightness and shortness of breath.    Cardiovascular:  Negative for chest pain.   Genitourinary:  Negative for difficulty urinating.   Musculoskeletal:  Positive for arthralgias.   Neurological:  Negative for dizziness and weakness.   Psychiatric/Behavioral:  Negative for dysphoric mood.        Objective   /79 (BP Location: Left arm, Patient Position: Sitting, Cuff Size: Standard)   Pulse 66   Temp 98.4  °F (36.9 °C) (Tympanic)   Wt 102 kg (224 lb)   SpO2 97%   BMI 28.38 kg/m²      Physical Exam  Vitals and nursing note reviewed.   Constitutional:       General: He is not in acute distress.     Appearance: Normal appearance. He is not ill-appearing.   HENT:      Head: Normocephalic.   Eyes:      Extraocular Movements: Extraocular movements intact.   Cardiovascular:      Rate and Rhythm: Normal rate and regular rhythm.      Heart sounds: Normal heart sounds.   Pulmonary:      Effort: Pulmonary effort is normal. No respiratory distress.      Breath sounds: Normal breath sounds.   Musculoskeletal:         General: Tenderness present. No swelling, deformity or signs of injury. Normal range of motion.      Right elbow: Tenderness present in olecranon process.      Lumbar back: Spasms and tenderness present. No swelling.   Skin:     General: Skin is warm and dry.      Coloration: Skin is not pale.      Findings: No erythema.   Neurological:      Mental Status: He is alert and oriented to person, place, and time.   Psychiatric:         Mood and Affect: Mood normal.         Behavior: Behavior normal.

## 2025-02-03 NOTE — PATIENT INSTRUCTIONS
Prednisone as ordered for pain/inflammation.  Flexeril as ordered for back spasm.  Rest/Ice/Compression/Elevate as directed.  Call with any problems/concerns.

## 2025-02-19 ENCOUNTER — APPOINTMENT (OUTPATIENT)
Dept: RADIOLOGY | Facility: CLINIC | Age: 35
End: 2025-02-19
Payer: COMMERCIAL

## 2025-02-19 VITALS — BODY MASS INDEX: 29.34 KG/M2 | HEIGHT: 75 IN | WEIGHT: 236 LBS

## 2025-02-19 DIAGNOSIS — M25.521 PAIN IN RIGHT ELBOW: ICD-10-CM

## 2025-02-19 DIAGNOSIS — M77.8 RIGHT ELBOW TENDONITIS: ICD-10-CM

## 2025-02-19 DIAGNOSIS — M77.11 LATERAL EPICONDYLITIS OF RIGHT ELBOW: Primary | ICD-10-CM

## 2025-02-19 PROCEDURE — 99243 OFF/OP CNSLTJ NEW/EST LOW 30: CPT | Performed by: ORTHOPAEDIC SURGERY

## 2025-02-19 PROCEDURE — 73080 X-RAY EXAM OF ELBOW: CPT

## 2025-02-19 NOTE — ASSESSMENT & PLAN NOTE
Findings consistent with right elbow lateral epicondylitis.  Right elbow x-ray reviewed in office with patient. Conservative treatment about his condition reviewed with patient. At this time patient will be given tennis elbow strap to wear daily with activity. He will start OT and have then make custom volar wrist splint to wear until symptoms resolve. This condition can take 9-12 months to resolve. When lifting have forearm supinated. Avoid repetitive gripping, grasping activities if possible. Nsaids, topical creams for pain. We dont' recommend cortisone injection, weakens tendon, last resort debridement of lateral epicondyle. See back in 2-3 months.  All patient's questions were answered to his satisfaction.  This note is created using dictation transcription.  It may contain typographical errors, grammatical errors, improperly dictated words, background noise and other errors.

## 2025-02-19 NOTE — PROGRESS NOTES
Assessment:     1. Lateral epicondylitis of right elbow    2. Pain in right elbow    3. Right elbow tendonitis        Plan:     Problem List Items Addressed This Visit          Musculoskeletal and Integument    Right elbow tendonitis    Lateral epicondylitis of right elbow - Primary    Findings consistent with right elbow lateral epicondylitis.  Right elbow x-ray reviewed in office with patient. Conservative treatment about his condition reviewed with patient. At this time patient will be given tennis elbow strap to wear daily with activity. He will start OT and have then make custom volar wrist splint to wear until symptoms resolve. This condition can take 9-12 months to resolve. When lifting have forearm supinated. Avoid repetitive gripping, grasping activities if possible. Nsaids, topical creams for pain. We dont' recommend cortisone injection, weakens tendon, last resort debridement of lateral epicondyle. See back in 2-3 months.  All patient's questions were answered to his satisfaction.  This note is created using dictation transcription.  It may contain typographical errors, grammatical errors, improperly dictated words, background noise and other errors.         Relevant Orders    Ambulatory Referral to PT/OT Hand Therapy    Tennis elbow strap     Other Visit Diagnoses         Pain in right elbow        Relevant Orders    XR elbow 3+ vw right           Subjective:     Patient ID: Mike Cole is a 34 y.o. male.  Chief Complaint:  34 yr old male RHD in for evaluation of right elbow pain. Referred by ELIN Grimes. Ongoing lateral elbow pain for past few months. Works construction. He was given prednisone 20mg by PCP. He does  a lot of repetitive heavy lifting. He states pain has gotten worse and can be sharp and acute in nature especially lifting activities. He states he has hard time even holding water bottle at times. He has tried various oral nsaids, topical creams with no relief. Pain runs from lateral  "elbow into forearm to wrist. Denies any numbness, tingling, injury or trauma.     Allergy:  No Known Allergies  Medications:  all current active meds have been reviewed  Past Medical History:  Past Medical History:   Diagnosis Date    Hepatitis C virus infection cured after antiviral drug therapy     Hordeolum externum of right upper eyelid     Nasal congestion      Past Surgical History:  Past Surgical History:   Procedure Laterality Date    CYST REMOVAL      L chest      Family History:  Family History   Problem Relation Age of Onset    Hypertension Mother      Social History:  Social History     Substance and Sexual Activity   Alcohol Use No    Comment: Social only     Social History     Substance and Sexual Activity   Drug Use No    Comment: hx opiate abuse     Social History     Tobacco Use   Smoking Status Former    Current packs/day: 0.00    Types: Cigarettes    Quit date: 2015    Years since quitting: 10.1   Smokeless Tobacco Never     Review of Systems   Constitutional:  Negative for chills and fever.   HENT:  Negative for ear pain and sore throat.    Eyes:  Negative for pain and visual disturbance.   Respiratory:  Negative for cough and shortness of breath.    Cardiovascular:  Negative for chest pain and palpitations.   Gastrointestinal:  Negative for abdominal pain and vomiting.   Genitourinary:  Negative for dysuria and hematuria.   Musculoskeletal:  Positive for arthralgias (right elbow). Negative for back pain and neck pain.   Skin:  Negative for color change and rash.   Neurological:  Negative for seizures and syncope.   Psychiatric/Behavioral: Negative.     All other systems reviewed and are negative.        Objective:  BP Readings from Last 1 Encounters:   02/03/25 136/79      Wt Readings from Last 1 Encounters:   02/19/25 107 kg (236 lb)      BMI:   Estimated body mass index is 29.9 kg/m² as calculated from the following:    Height as of this encounter: 6' 2.5\" (1.892 m).    Weight as of this " "encounter: 107 kg (236 lb).  BSA:   Estimated body surface area is 2.34 meters squared as calculated from the following:    Height as of this encounter: 6' 2.5\" (1.892 m).    Weight as of this encounter: 107 kg (236 lb).   Physical Exam  Vitals and nursing note reviewed.   Constitutional:       Appearance: Normal appearance. He is well-developed.   HENT:      Head: Normocephalic and atraumatic.      Right Ear: External ear normal.      Left Ear: External ear normal.   Eyes:      Extraocular Movements: Extraocular movements intact.      Conjunctiva/sclera: Conjunctivae normal.   Pulmonary:      Effort: Pulmonary effort is normal.   Musculoskeletal:         General: Tenderness (right elbow arthralgia) present.      Cervical back: Neck supple.   Skin:     General: Skin is warm and dry.   Neurological:      Mental Status: He is alert and oriented to person, place, and time.      Deep Tendon Reflexes: Reflexes are normal and symmetric.   Psychiatric:         Mood and Affect: Mood normal.         Behavior: Behavior normal.       Right Elbow Exam     Tenderness   The patient is experiencing tenderness in the lateral epicondyle.     Range of Motion   Extension:  normal   Flexion:  normal   Pronation:  normal   Supination:  normal     Tests   Varus: negative  Valgus: negative  Tinel's sign (cubital tunnel): negative    Other   Erythema: absent  Scars: absent  Sensation: normal  Pulse: present    Comments:  Pain against resisted wrist extension, pain against long finger resisted extension             I have personally reviewed pertinent films in PACS and my interpretation is x-ray right elbow well maintained joint space, no fracture, no calcifications .    Scribe Attestation      I,:  Facundo Zepeda am acting as a scribe while in the presence of the attending physician.:       I,:  Felipe Sales MD personally performed the services described in this documentation    as scribed in my presence.:            "

## 2025-03-03 ENCOUNTER — TELEPHONE (OUTPATIENT)
Age: 35
End: 2025-03-03

## 2025-03-03 NOTE — TELEPHONE ENCOUNTER
Patient call about his elbow pain is getting more worse and would like any suggestion for pain medication for his elbow. Please reach out to the patient,. Thank you

## 2025-03-03 NOTE — TELEPHONE ENCOUNTER
He was referred to and seen by ortho regarding elbow. Looks like ortho referred to PT. He will need to call ortho to follow up since there is no improvement.

## 2025-03-10 ENCOUNTER — EVALUATION (OUTPATIENT)
Dept: OCCUPATIONAL THERAPY | Facility: CLINIC | Age: 35
End: 2025-03-10
Payer: COMMERCIAL

## 2025-03-10 DIAGNOSIS — M77.11 LATERAL EPICONDYLITIS OF RIGHT ELBOW: ICD-10-CM

## 2025-03-10 PROCEDURE — 97140 MANUAL THERAPY 1/> REGIONS: CPT | Performed by: OCCUPATIONAL THERAPIST

## 2025-03-10 PROCEDURE — 97165 OT EVAL LOW COMPLEX 30 MIN: CPT | Performed by: OCCUPATIONAL THERAPIST

## 2025-03-10 PROCEDURE — 97760 ORTHOTIC MGMT&TRAING 1ST ENC: CPT | Performed by: OCCUPATIONAL THERAPIST

## 2025-03-10 NOTE — PROGRESS NOTES
OT Evaluation     Today's date: 3/10/2025  Patient name: Mike Cole  : 1990  MRN: [id number]  Referring provider: Felipe Sales MD  Dx:   Encounter Diagnosis     ICD-10-CM    1. Lateral epicondylitis of right elbow  M77.11 Ambulatory Referral to PT/OT Hand Therapy                     Assessment  Impairments: impaired physical strength and lacks appropriate home exercise program  Functional limitations: pain with gripping , lifting , climbing , tool use  Symptom irritability: high    Assessment details: Bolivar presents with R lateral elbow pain that started in November . 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 He has pain do work activities. He has good proximal strength. He has weak R  with pain.  He has pain with MMT for wrist ext. He has + provocative tests .  He is  and has 2 young children.   Understanding of Dx/Px/POC: good     Prognosis: good    Goals  STG- 1 wk  1- I with HEP   2- modify lifting , tool use  3- utelize  custom wrist orthosis at night     LTG-  8 wks  1- no pain R elbow   2- improve R  10#  3- achieve projected FOTO score   4- negative provocative tests    Plan  Patient would benefit from: custom splinting, skilled occupational therapy and OT eval  Planned modality interventions: cryotherapy and thermotherapy: hydrocollator packs    Planned therapy interventions: activity modification, joint mobilization, manual therapy, massage, IASTM, stretching, strengthening, functional ROM exercises, home exercise program and behavior modification  Speech planned therapy intervention: muscle relaxation training    Frequency: 1-2x wk.  Plan of Care beginning date: 3/10/2025  Treatment plan discussed with: patient        Subjective Evaluation    History of Present Illness  Date of onset: 2024  Mechanism of injury: Bolivar reports onset of R elbow pain that started in November .   Quality of life: good    Patient Goals  Patient goals for  therapy: decreased pain    Pain  Current pain ratin  At best pain ratin  At worst pain rating: 10  Location: R lat ep  Quality: tight and sharp  Relieving factors: rest and medications  Aggravating factors: lifting (gripping , pulling , climbing)  Progression: worsening    Social Support  Steps to enter house: yes  Stairs in house: yes   Lives in: multiple-level home  Lives with: spouse and young children    Employment status: working  Hand dominance: right    Treatments  Current treatment: medication        Objective     Active Range of Motion     Right Shoulder   Normal active range of motion    Right Elbow   Normal active range of motion    Additional Active Range of Motion Details  Tender R lat ep and ext mass    Strength/Myotome Testing     Right Shoulder   Normal muscle strength    Right Elbow   Normal strength    Left Wrist/Hand      (2nd hand position)     Trial 1: 100    Right Wrist/Hand   Normal wrist strength     (2nd hand position)     Trial 1: 80    Additional Strength Details  Pain with gripping    with elbow ext R/L= 58/80    Tests     Right Elbow   Positive Cozen's and Mill's.     Additional Tests Details  + MF ext             Daily Treatment Diary     Precautions: universal   CO-MORBIDITIES:  HEP ACCESS CODE:   FOTO Completed On: 3/10/25    POC Expires Reeval for Medicare to be completed  Auth Expiration Date PT/OT/STVisit Limit   5/10/25 By visit   25 30    Completed on visit                  Auth Status DATE 3/10        Approved Visit # 1         Remaining 29        MANUAL THERAPY         Graston lat ep  4m        Nirschl stretches  4m        MWM  4m        Tape radial head  Apply         Custom R wrist orthosis  13m        Behavior mod discussed  4m        THERAPEUTIC EXERCISE HEP 4x day         Ecc wrist   2#  3x10                                                                                                                                                     NEUROMUSCULAR REEDUCATION                                                                                                                                                       THERAPEUTIC ACTIVITY                                                                                                    MODALITIES         MH  5m        CP  5m

## 2025-03-21 ENCOUNTER — OFFICE VISIT (OUTPATIENT)
Dept: OCCUPATIONAL THERAPY | Facility: CLINIC | Age: 35
End: 2025-03-21

## 2025-03-21 DIAGNOSIS — M77.11 LATERAL EPICONDYLITIS OF RIGHT ELBOW: Primary | ICD-10-CM

## 2025-03-21 PROCEDURE — 97140 MANUAL THERAPY 1/> REGIONS: CPT | Performed by: OCCUPATIONAL THERAPIST

## 2025-03-21 NOTE — PROGRESS NOTES
Daily Note     Today's date: 3/21/2025  Patient name: Mike Cole  : 1990  MRN: [id number]  Referring provider: Felipe Sales MD  Dx:   Encounter Diagnosis     ICD-10-CM    1. Lateral epicondylitis of right elbow  M77.11                      Subjective: I still have pain       Objective: See treatment diary below      Assessment: Tolerated treatment well. Patient  has relief with MWM .       Plan: Continue per plan of care.      Daily Treatment Diary     Precautions: universal   CO-MORBIDITIES:  HEP ACCESS CODE:   FOTO Completed On: 3/10/25    POC Expires Reeval for Medicare to be completed  Auth Expiration Date PT/OT/STVisit Limit   5/10/25 By visit   25 30    Completed on visit                  Auth Status DATE 3/10 3/21       Approved Visit # 1 2        Remaining 29 28       MANUAL THERAPY         Graston lat ep  4m 4m       Nirschl stretches  4m 4m       MWM  4m 4m       Tape radial head  Apply         Custom R wrist orthosis  13m        Behavior mod discussed  4m        THERAPEUTIC EXERCISE HEP 4x day         Ecc wrist   2#  3x10 2#  3x10       ER 5#   cuff   3x10                                                                                                                                         NEUROMUSCULAR REEDUCATION                                                                                                                                                       THERAPEUTIC ACTIVITY                                                                                                    MODALITIES         MH  5m 5m       CP  5m 5m

## 2025-04-04 ENCOUNTER — OFFICE VISIT (OUTPATIENT)
Dept: OCCUPATIONAL THERAPY | Facility: CLINIC | Age: 35
End: 2025-04-04

## 2025-04-04 DIAGNOSIS — M77.11 LATERAL EPICONDYLITIS OF RIGHT ELBOW: Primary | ICD-10-CM

## 2025-04-04 PROCEDURE — 97140 MANUAL THERAPY 1/> REGIONS: CPT | Performed by: OCCUPATIONAL THERAPIST

## 2025-04-04 NOTE — PROGRESS NOTES
Daily Note     Today's date: 2025  Patient name: Mike Cole  : 1990  MRN: [id number]  Referring provider: Felipe Sales MD  Dx:   Encounter Diagnosis     ICD-10-CM    1. Lateral epicondylitis of right elbow  M77.11                      Subjective: I am really sore , it hurts with work       Objective: See treatment diary below      Assessment: Tolerated treatment well. Patient  has pain with  work . He gets  relief  with MWM . Behavior mod reinforced      Plan: Continue per plan of care.      Daily Treatment Diary     Precautions: universal   CO-MORBIDITIES:  HEP ACCESS CODE:   FOTO Completed On: 3/10/25    POC Expires Reeval for Medicare to be completed  Auth Expiration Date PT/OT/STVisit Limit   5/10/25 By visit   25 30    Completed on visit                  Auth Status DATE 3/10 3/21 4/4      Approved Visit # 1 2 3       Remaining 29 28 27      MANUAL THERAPY         Graston lat ep  4m 4m 4m      Nirschl stretches  4m 4m 4m      MWM  4m 4m 4m      Tape radial head  Apply         Custom R wrist orthosis  13m        Behavior mod discussed  4m        THERAPEUTIC EXERCISE HEP 4x day         Ecc wrist   2#  3x10 2#  3x10 2#  3x10      ER 5#   cuff   3x10 3x10                                                                                                                                        NEUROMUSCULAR REEDUCATION                                                                                                                                                       THERAPEUTIC ACTIVITY                                                                                                    MODALITIES         MH  5m 5m 5m      CP  5m 5m 5m

## 2025-04-11 ENCOUNTER — OFFICE VISIT (OUTPATIENT)
Dept: OCCUPATIONAL THERAPY | Facility: CLINIC | Age: 35
End: 2025-04-11

## 2025-04-11 DIAGNOSIS — M77.11 LATERAL EPICONDYLITIS OF RIGHT ELBOW: Primary | ICD-10-CM

## 2025-04-11 PROCEDURE — 97140 MANUAL THERAPY 1/> REGIONS: CPT | Performed by: OCCUPATIONAL THERAPIST

## 2025-04-11 NOTE — PROGRESS NOTES
Daily Note     Today's date: 2025  Patient name: Mike Cole  : 1990  MRN: [id number]  Referring provider: Felipe Sales MD  Dx:   Encounter Diagnosis     ICD-10-CM    1. Lateral epicondylitis of right elbow  M77.11                      Subjective: I am still having pain . I work 7 days week       Objective: See treatment diary below      Assessment: Tolerated treatment well. Patient  has improved  pain with tx . We discussed behavior mod and rest       Plan: Continue per plan of care.      Daily Treatment Diary     Precautions: universal   CO-MORBIDITIES:  HEP ACCESS CODE:   FOTO Completed On: 3/10/25    POC Expires Reeval for Medicare to be completed  Auth Expiration Date PT/OT/STVisit Limit   5/10/25 By visit   25 30    Completed on visit                  Auth Status DATE 3/10 3/21 4/4 4/11     Approved Visit # 1 2 3 4      Remaining 29 28 27 26     MANUAL THERAPY         Graston lat ep  4m 4m 4m 4m     Nirschl stretches  4m 4m 4m 4m     MWM  4m 4m 4m 4m     Tape radial head  Apply         Custom R wrist orthosis  13m        Behavior mod discussed  4m        THERAPEUTIC EXERCISE HEP 4x day         Ecc wrist   2#  3x10 2#  3x10 2#  3x10 2#  3x10     ER 5#   cuff   3x10 3x10 3x10                                                                                                                                       NEUROMUSCULAR REEDUCATION                                                                                                                                                       THERAPEUTIC ACTIVITY                                                                                                    MODALITIES         MH  5m 5m 5m 5m     CP  5m 5m 5m 5m